# Patient Record
Sex: FEMALE | Race: WHITE | URBAN - METROPOLITAN AREA
[De-identification: names, ages, dates, MRNs, and addresses within clinical notes are randomized per-mention and may not be internally consistent; named-entity substitution may affect disease eponyms.]

---

## 2017-05-15 PROBLEM — T81.89XA NON-HEALING SURGICAL WOUND: Status: ACTIVE | Noted: 2017-01-04

## 2017-07-27 PROBLEM — B35.1 ONYCHOMYCOSIS OF TOENAIL: Status: ACTIVE | Noted: 2017-07-27

## 2018-05-17 PROBLEM — G47.33 OBSTRUCTIVE SLEEP APNEA SYNDROME: Status: ACTIVE | Noted: 2018-05-17

## 2018-12-21 ENCOUNTER — OFFICE VISIT (OUTPATIENT)
Dept: PRIMARY CARE CLINIC | Age: 83
End: 2018-12-21
Payer: MEDICARE

## 2018-12-21 VITALS
SYSTOLIC BLOOD PRESSURE: 138 MMHG | BODY MASS INDEX: 21.9 KG/M2 | DIASTOLIC BLOOD PRESSURE: 86 MMHG | WEIGHT: 127.6 LBS | OXYGEN SATURATION: 98 % | HEART RATE: 58 BPM

## 2018-12-21 DIAGNOSIS — I10 ESSENTIAL HYPERTENSION: ICD-10-CM

## 2018-12-21 DIAGNOSIS — E03.9 HYPOTHYROIDISM, UNSPECIFIED TYPE: ICD-10-CM

## 2018-12-21 DIAGNOSIS — G47.33 OBSTRUCTIVE SLEEP APNEA SYNDROME: Primary | ICD-10-CM

## 2018-12-21 DIAGNOSIS — R73.9 HYPERGLYCEMIA: ICD-10-CM

## 2018-12-21 PROCEDURE — 1101F PT FALLS ASSESS-DOCD LE1/YR: CPT | Performed by: FAMILY MEDICINE

## 2018-12-21 PROCEDURE — 1090F PRES/ABSN URINE INCON ASSESS: CPT | Performed by: FAMILY MEDICINE

## 2018-12-21 PROCEDURE — G8420 CALC BMI NORM PARAMETERS: HCPCS | Performed by: FAMILY MEDICINE

## 2018-12-21 PROCEDURE — G8484 FLU IMMUNIZE NO ADMIN: HCPCS | Performed by: FAMILY MEDICINE

## 2018-12-21 PROCEDURE — G8400 PT W/DXA NO RESULTS DOC: HCPCS | Performed by: FAMILY MEDICINE

## 2018-12-21 PROCEDURE — G8427 DOCREV CUR MEDS BY ELIG CLIN: HCPCS | Performed by: FAMILY MEDICINE

## 2018-12-21 PROCEDURE — 4040F PNEUMOC VAC/ADMIN/RCVD: CPT | Performed by: FAMILY MEDICINE

## 2018-12-21 PROCEDURE — 99213 OFFICE O/P EST LOW 20 MIN: CPT | Performed by: FAMILY MEDICINE

## 2018-12-21 PROCEDURE — 1123F ACP DISCUSS/DSCN MKR DOCD: CPT | Performed by: FAMILY MEDICINE

## 2018-12-21 PROCEDURE — 1036F TOBACCO NON-USER: CPT | Performed by: FAMILY MEDICINE

## 2018-12-21 ASSESSMENT — ENCOUNTER SYMPTOMS
WHEEZING: 0
SORE THROAT: 0
COUGH: 0
NAUSEA: 0
ABDOMINAL PAIN: 0
EYE DISCHARGE: 0
RHINORRHEA: 0
SHORTNESS OF BREATH: 0
EYE REDNESS: 0
DIARRHEA: 0
VOMITING: 0

## 2018-12-26 RX ORDER — LEVOTHYROXINE SODIUM 0.15 MG/1
150 TABLET ORAL DAILY
Qty: 30 TABLET | Refills: 3 | Status: SHIPPED | OUTPATIENT
Start: 2018-12-26 | End: 2018-12-27 | Stop reason: SDUPTHER

## 2018-12-27 RX ORDER — LEVOTHYROXINE SODIUM 0.15 MG/1
150 TABLET ORAL DAILY
Qty: 90 TABLET | Refills: 3 | Status: SHIPPED | OUTPATIENT
Start: 2018-12-27 | End: 2019-12-06 | Stop reason: SDUPTHER

## 2019-04-16 LAB
AVERAGE GLUCOSE: 120
HBA1C MFR BLD: 5.8 %
TSH SERPL DL<=0.05 MIU/L-ACNC: NORMAL UIU/ML

## 2019-04-19 DIAGNOSIS — R73.9 HYPERGLYCEMIA: ICD-10-CM

## 2019-04-19 DIAGNOSIS — E03.9 HYPOTHYROIDISM, UNSPECIFIED TYPE: ICD-10-CM

## 2019-04-25 ENCOUNTER — OFFICE VISIT (OUTPATIENT)
Dept: PRIMARY CARE CLINIC | Age: 84
End: 2019-04-25
Payer: MEDICARE

## 2019-04-25 VITALS
HEART RATE: 64 BPM | SYSTOLIC BLOOD PRESSURE: 124 MMHG | OXYGEN SATURATION: 97 % | BODY MASS INDEX: 20.7 KG/M2 | WEIGHT: 120.6 LBS | DIASTOLIC BLOOD PRESSURE: 82 MMHG

## 2019-04-25 DIAGNOSIS — I10 ESSENTIAL HYPERTENSION: Primary | ICD-10-CM

## 2019-04-25 DIAGNOSIS — E03.9 HYPOTHYROIDISM, UNSPECIFIED TYPE: ICD-10-CM

## 2019-04-25 DIAGNOSIS — Z78.0 MENOPAUSE: ICD-10-CM

## 2019-04-25 PROCEDURE — 1090F PRES/ABSN URINE INCON ASSESS: CPT | Performed by: FAMILY MEDICINE

## 2019-04-25 PROCEDURE — 1123F ACP DISCUSS/DSCN MKR DOCD: CPT | Performed by: FAMILY MEDICINE

## 2019-04-25 PROCEDURE — 99213 OFFICE O/P EST LOW 20 MIN: CPT | Performed by: FAMILY MEDICINE

## 2019-04-25 PROCEDURE — 4040F PNEUMOC VAC/ADMIN/RCVD: CPT | Performed by: FAMILY MEDICINE

## 2019-04-25 PROCEDURE — G8427 DOCREV CUR MEDS BY ELIG CLIN: HCPCS | Performed by: FAMILY MEDICINE

## 2019-04-25 PROCEDURE — G8420 CALC BMI NORM PARAMETERS: HCPCS | Performed by: FAMILY MEDICINE

## 2019-04-25 PROCEDURE — 1036F TOBACCO NON-USER: CPT | Performed by: FAMILY MEDICINE

## 2019-04-25 PROCEDURE — G8400 PT W/DXA NO RESULTS DOC: HCPCS | Performed by: FAMILY MEDICINE

## 2019-04-25 ASSESSMENT — ENCOUNTER SYMPTOMS
WHEEZING: 0
NAUSEA: 0
ABDOMINAL PAIN: 0
EYE REDNESS: 0
RHINORRHEA: 0
COUGH: 0
DIARRHEA: 0
SHORTNESS OF BREATH: 0
VOMITING: 0
SORE THROAT: 0
EYE DISCHARGE: 0

## 2019-04-25 NOTE — PROGRESS NOTES
[facility] PRIMARY CARE  [id number] [facility]  Dept: 506 Jae Lee is a 81 y.o. female who presents today for her medical conditions/complaintsas noted below. Chief Complaint   Patient presents with    Medication Check     4 month follow up. HPI:     HPI- feeling okay. BP has been good. Lost some more weight- eating right and exercising. No refills needed. Using CPAP but only 1-4 hours a night. Goes to sleep doctor pretty soon. LDL Calculated (mg/dL)   Date Value   10/25/2018 84   03/02/2017 96   01/19/2017 102       (goal LDL is <100)   No results found for: AST, ALT, BUN  BP Readings from Last 3 Encounters:   04/25/19 124/82   12/21/18 138/86   08/23/18 128/72          (goal 120/80)    No past medical history on file. Past Surgical History:   Procedure Laterality Date    ANKLE FRACTURE SURGERY Left 10/18/2016    dr. Wolfgang Marcos         Family History   Problem Relation Age of Onset    Heart Disease Mother     Heart Attack Mother     Heart Disease Father     Heart Attack Father        Social History     Tobacco Use    Smoking status: Never Smoker    Smokeless tobacco: Never Used   Substance Use Topics    Alcohol use: Yes     Alcohol/week: 0.0 oz     Comment: ocassionally      Current Outpatient Medications   Medication Sig Dispense Refill    levothyroxine (SYNTHROID) 150 MCG tablet Take 1 tablet by mouth Daily 90 tablet 3    candesartan (ATACAND) 32 MG tablet Take 32 mg by mouth daily      amLODIPine (NORVASC) 2.5 MG tablet Take 2.5 mg by mouth      metoprolol tartrate (LOPRESSOR) 50 MG tablet Take 1 tablet by mouth 2 times daily 60 tablet 0    Multiple Vitamins-Minerals (MULTI FOR HER 50+) TABS Take 1 tablet by mouth daily       No current facility-administered medications for this visit.       Allergies   Allergen Reactions    Lorcet  [Hydrocodone-Acetaminophen]      Other reaction(s): Confusion  Other reaction(s): Confusion    Oxycodone-Acetaminophen Nausea Only    Sodium Chloride Nausea Only       Health Maintenance   Topic Date Due    DTaP/Tdap/Td vaccine (1 - Tdap) 07/26/1955    Shingles Vaccine (1 of 2) 07/26/1986    DEXA (modify frequency per FRAX score)  07/26/2001    Pneumococcal 65+ years Vaccine (2 of 2 - PPSV23) 05/17/2019    Flu vaccine (Season Ended) 09/01/2019    Potassium monitoring  10/25/2019    Creatinine monitoring  10/25/2019    TSH testing  04/16/2020       Subjective:      Review of Systems   Constitutional: Negative for chills and fever. HENT: Negative for rhinorrhea and sore throat. Eyes: Negative for discharge and redness. Respiratory: Negative for cough, shortness of breath and wheezing. Cardiovascular: Negative for chest pain and palpitations. Gastrointestinal: Negative for abdominal pain, diarrhea, nausea and vomiting. Genitourinary: Negative for dysuria and frequency. Musculoskeletal: Negative for arthralgias and myalgias. Neurological: Negative for dizziness, light-headedness and headaches. Psychiatric/Behavioral: Positive for sleep disturbance (not sleeping well.  ). Objective:     /82   Pulse 64   Wt 120 lb 9.6 oz (54.7 kg)   SpO2 97%   BMI 20.70 kg/m²   Physical Exam   Constitutional: She is oriented to person, place, and time. She appears well-developed and well-nourished. No distress. HENT:   Head: Normocephalic and atraumatic. Mouth/Throat: Oropharynx is clear and moist.   Eyes: Pupils are equal, round, and reactive to light. Conjunctivae are normal. Right eye exhibits no discharge. Left eye exhibits no discharge. No scleral icterus. Neck: No tracheal deviation present. No thyromegaly present. Cardiovascular: Normal rate, regular rhythm and normal heart sounds. No carotid bruits   Pulmonary/Chest: Effort normal and breath sounds normal. No respiratory distress. She has no wheezes.

## 2019-06-26 DIAGNOSIS — I10 ESSENTIAL HYPERTENSION: Primary | ICD-10-CM

## 2019-07-23 ENCOUNTER — PROCEDURE VISIT (OUTPATIENT)
Dept: PRIMARY CARE CLINIC | Age: 84
End: 2019-07-23
Payer: MEDICARE

## 2019-07-23 DIAGNOSIS — Z78.0 MENOPAUSE: Primary | ICD-10-CM

## 2019-07-23 PROCEDURE — 77080 DXA BONE DENSITY AXIAL: CPT | Performed by: FAMILY MEDICINE

## 2019-07-24 DIAGNOSIS — Z78.0 MENOPAUSE: ICD-10-CM

## 2019-08-26 ENCOUNTER — OFFICE VISIT (OUTPATIENT)
Dept: PRIMARY CARE CLINIC | Age: 84
End: 2019-08-26
Payer: MEDICARE

## 2019-08-26 VITALS
OXYGEN SATURATION: 98 % | SYSTOLIC BLOOD PRESSURE: 132 MMHG | BODY MASS INDEX: 20.05 KG/M2 | HEART RATE: 58 BPM | DIASTOLIC BLOOD PRESSURE: 82 MMHG | WEIGHT: 116.8 LBS

## 2019-08-26 DIAGNOSIS — I10 ESSENTIAL HYPERTENSION: Primary | ICD-10-CM

## 2019-08-26 DIAGNOSIS — G47.33 OBSTRUCTIVE SLEEP APNEA SYNDROME: ICD-10-CM

## 2019-08-26 DIAGNOSIS — E03.9 HYPOTHYROIDISM, UNSPECIFIED TYPE: ICD-10-CM

## 2019-08-26 DIAGNOSIS — M85.89 OSTEOPENIA OF MULTIPLE SITES: ICD-10-CM

## 2019-08-26 PROCEDURE — 1036F TOBACCO NON-USER: CPT | Performed by: FAMILY MEDICINE

## 2019-08-26 PROCEDURE — 1090F PRES/ABSN URINE INCON ASSESS: CPT | Performed by: FAMILY MEDICINE

## 2019-08-26 PROCEDURE — 4040F PNEUMOC VAC/ADMIN/RCVD: CPT | Performed by: FAMILY MEDICINE

## 2019-08-26 PROCEDURE — G8420 CALC BMI NORM PARAMETERS: HCPCS | Performed by: FAMILY MEDICINE

## 2019-08-26 PROCEDURE — G8427 DOCREV CUR MEDS BY ELIG CLIN: HCPCS | Performed by: FAMILY MEDICINE

## 2019-08-26 PROCEDURE — 99213 OFFICE O/P EST LOW 20 MIN: CPT | Performed by: FAMILY MEDICINE

## 2019-08-26 PROCEDURE — 1123F ACP DISCUSS/DSCN MKR DOCD: CPT | Performed by: FAMILY MEDICINE

## 2019-08-26 PROCEDURE — G8399 PT W/DXA RESULTS DOCUMENT: HCPCS | Performed by: FAMILY MEDICINE

## 2019-08-26 ASSESSMENT — PATIENT HEALTH QUESTIONNAIRE - PHQ9
SUM OF ALL RESPONSES TO PHQ9 QUESTIONS 1 & 2: 0
1. LITTLE INTEREST OR PLEASURE IN DOING THINGS: 0
SUM OF ALL RESPONSES TO PHQ QUESTIONS 1-9: 0
SUM OF ALL RESPONSES TO PHQ QUESTIONS 1-9: 0
2. FEELING DOWN, DEPRESSED OR HOPELESS: 0

## 2019-08-26 ASSESSMENT — ENCOUNTER SYMPTOMS
CONSTIPATION: 0
DIARRHEA: 0
BLOOD IN STOOL: 0
SHORTNESS OF BREATH: 0

## 2019-08-26 NOTE — PROGRESS NOTES
[address] [facility] PRIMARY CARE  [address] [place] [id number]  Dept: 506 Jae Lee is a 81 y.o. female who presents today for her medical conditions/complaintsas noted below. Chief Complaint   Patient presents with    Medication Check     4 month follow up.  Other     Pt would like to discuss her Dexa scan       HPI:     HPI Pt is here for her scheduled follow up and to discuss results of her recent Dexa scan. Has had a \"goofy\" feeling a few times starting last week and felt like her legs were rubbery. Had another episode this morning where her legs felt weak. LDL Calculated (mg/dL)   Date Value   10/25/2018 84   03/02/2017 96   01/19/2017 102       (goal LDL is <100)   No results found for: AST, ALT, BUN  BP Readings from Last 3 Encounters:   08/26/19 132/82   04/25/19 124/82   12/21/18 138/86          (goal 120/80)    No past medical history on file. Past Surgical History:   Procedure Laterality Date    ANKLE FRACTURE SURGERY Left 10/18/2016    dr. Katelyn Partida         Family History   Problem Relation Age of Onset    Heart Disease Mother     Heart Attack Mother     Heart Disease Father     Heart Attack Father        Social History     Tobacco Use    Smoking status: Never Smoker    Smokeless tobacco: Never Used   Substance Use Topics    Alcohol use:  Yes     Alcohol/week: 0.0 standard drinks     Comment: ocassionally      Current Outpatient Medications   Medication Sig Dispense Refill    levothyroxine (SYNTHROID) 150 MCG tablet Take 1 tablet by mouth Daily 90 tablet 3    candesartan (ATACAND) 32 MG tablet Take 32 mg by mouth daily      amLODIPine (NORVASC) 2.5 MG tablet Take 2.5 mg by mouth      metoprolol tartrate (LOPRESSOR) 50 MG tablet Take 1 tablet by mouth 2 times daily 60 tablet 0    Multiple Vitamins-Minerals (MULTI FOR HER 50+) TABS Take 1 tablet by mouth daily       No current hypertension     2. Hypothyroidism, unspecified type     3. Obstructive sleep apnea syndrome     4. Osteopenia of multiple sites          Plan:      Return in about 3 months (around 11/26/2019) for HTN f/u. No orders of the defined types were placed in this encounter. No orders of the defined types were placed in this encounter. Patient given educationalmaterials - see patient instructions. Discussed use, benefit, and side effectsof prescribed medications. All patient questions answered. Pt voiced understanding. Reviewed health maintenance. Instructed to continue current medications, diet andexercise. Patient agreed with treatment plan. Follow up as directed.      Electronicallysigned by Bryan Edmond MD on 8/26/2019 at 12:34 PM

## 2019-12-06 ENCOUNTER — OFFICE VISIT (OUTPATIENT)
Dept: PRIMARY CARE CLINIC | Age: 84
End: 2019-12-06
Payer: MEDICARE

## 2019-12-06 VITALS
WEIGHT: 117.2 LBS | BODY MASS INDEX: 20.12 KG/M2 | SYSTOLIC BLOOD PRESSURE: 116 MMHG | HEART RATE: 52 BPM | DIASTOLIC BLOOD PRESSURE: 80 MMHG

## 2019-12-06 DIAGNOSIS — R73.9 HYPERGLYCEMIA: ICD-10-CM

## 2019-12-06 DIAGNOSIS — E03.9 HYPOTHYROIDISM, UNSPECIFIED TYPE: ICD-10-CM

## 2019-12-06 DIAGNOSIS — I10 ESSENTIAL HYPERTENSION: Primary | ICD-10-CM

## 2019-12-06 DIAGNOSIS — G47.33 OBSTRUCTIVE SLEEP APNEA SYNDROME: ICD-10-CM

## 2019-12-06 PROCEDURE — 1036F TOBACCO NON-USER: CPT | Performed by: FAMILY MEDICINE

## 2019-12-06 PROCEDURE — G8484 FLU IMMUNIZE NO ADMIN: HCPCS | Performed by: FAMILY MEDICINE

## 2019-12-06 PROCEDURE — 4040F PNEUMOC VAC/ADMIN/RCVD: CPT | Performed by: FAMILY MEDICINE

## 2019-12-06 PROCEDURE — G8399 PT W/DXA RESULTS DOCUMENT: HCPCS | Performed by: FAMILY MEDICINE

## 2019-12-06 PROCEDURE — 1123F ACP DISCUSS/DSCN MKR DOCD: CPT | Performed by: FAMILY MEDICINE

## 2019-12-06 PROCEDURE — G0009 ADMIN PNEUMOCOCCAL VACCINE: HCPCS | Performed by: FAMILY MEDICINE

## 2019-12-06 PROCEDURE — G8427 DOCREV CUR MEDS BY ELIG CLIN: HCPCS | Performed by: FAMILY MEDICINE

## 2019-12-06 PROCEDURE — G8420 CALC BMI NORM PARAMETERS: HCPCS | Performed by: FAMILY MEDICINE

## 2019-12-06 PROCEDURE — 1090F PRES/ABSN URINE INCON ASSESS: CPT | Performed by: FAMILY MEDICINE

## 2019-12-06 PROCEDURE — 99213 OFFICE O/P EST LOW 20 MIN: CPT | Performed by: FAMILY MEDICINE

## 2019-12-06 PROCEDURE — 90732 PPSV23 VACC 2 YRS+ SUBQ/IM: CPT | Performed by: FAMILY MEDICINE

## 2019-12-06 RX ORDER — LEVOTHYROXINE SODIUM 0.15 MG/1
150 TABLET ORAL DAILY
Qty: 90 TABLET | Refills: 3 | Status: SHIPPED | OUTPATIENT
Start: 2019-12-06 | End: 2021-01-14 | Stop reason: SDUPTHER

## 2019-12-06 ASSESSMENT — ENCOUNTER SYMPTOMS
ABDOMINAL PAIN: 0
COUGH: 0
EYE REDNESS: 0
VOMITING: 0
WHEEZING: 0
RHINORRHEA: 0
SORE THROAT: 0
EYE DISCHARGE: 0
DIARRHEA: 0
SHORTNESS OF BREATH: 0
NAUSEA: 0

## 2019-12-09 LAB
ALBUMIN SERPL-MCNC: NORMAL G/DL
ALP BLD-CCNC: NORMAL U/L
ALT SERPL-CCNC: NORMAL U/L
ANION GAP SERPL CALCULATED.3IONS-SCNC: NORMAL MMOL/L
AST SERPL-CCNC: NORMAL U/L
AVERAGE GLUCOSE: 114
BASOPHILS ABSOLUTE: NORMAL
BASOPHILS RELATIVE PERCENT: NORMAL
BILIRUB SERPL-MCNC: NORMAL MG/DL
BUN BLDV-MCNC: NORMAL MG/DL
CALCIUM SERPL-MCNC: NORMAL MG/DL
CHLORIDE BLD-SCNC: NORMAL MMOL/L
CO2: NORMAL
CREAT SERPL-MCNC: NORMAL MG/DL
EOSINOPHILS ABSOLUTE: NORMAL
EOSINOPHILS RELATIVE PERCENT: NORMAL
GFR CALCULATED: NORMAL
GLUCOSE BLD-MCNC: 128 MG/DL
HBA1C MFR BLD: 5.6 %
HCT VFR BLD CALC: NORMAL %
HEMOGLOBIN: NORMAL
LYMPHOCYTES ABSOLUTE: NORMAL
LYMPHOCYTES RELATIVE PERCENT: NORMAL
MCH RBC QN AUTO: NORMAL PG
MCHC RBC AUTO-ENTMCNC: NORMAL G/DL
MCV RBC AUTO: NORMAL FL
MONOCYTES ABSOLUTE: NORMAL
MONOCYTES RELATIVE PERCENT: NORMAL
NEUTROPHILS ABSOLUTE: NORMAL
NEUTROPHILS RELATIVE PERCENT: NORMAL
PDW BLD-RTO: NORMAL %
PLATELET # BLD: NORMAL 10*3/UL
PMV BLD AUTO: NORMAL FL
POTASSIUM SERPL-SCNC: NORMAL MMOL/L
RBC # BLD: NORMAL 10*6/UL
SODIUM BLD-SCNC: NORMAL MMOL/L
TOTAL PROTEIN: NORMAL
TSH SERPL DL<=0.05 MIU/L-ACNC: NORMAL M[IU]/L
WBC # BLD: NORMAL 10*3/UL

## 2019-12-12 DIAGNOSIS — R73.9 HYPERGLYCEMIA: ICD-10-CM

## 2019-12-12 DIAGNOSIS — I10 ESSENTIAL HYPERTENSION: ICD-10-CM

## 2019-12-12 DIAGNOSIS — E03.9 HYPOTHYROIDISM, UNSPECIFIED TYPE: ICD-10-CM

## 2019-12-12 DIAGNOSIS — G47.33 OBSTRUCTIVE SLEEP APNEA SYNDROME: ICD-10-CM

## 2020-09-03 ENCOUNTER — OFFICE VISIT (OUTPATIENT)
Dept: PRIMARY CARE CLINIC | Age: 85
End: 2020-09-03
Payer: MEDICARE

## 2020-09-03 VITALS
SYSTOLIC BLOOD PRESSURE: 128 MMHG | BODY MASS INDEX: 20.87 KG/M2 | DIASTOLIC BLOOD PRESSURE: 72 MMHG | WEIGHT: 117.8 LBS | HEIGHT: 63 IN | OXYGEN SATURATION: 98 % | TEMPERATURE: 96.9 F | HEART RATE: 76 BPM

## 2020-09-03 PROBLEM — I24.9 ACUTE CORONARY SYNDROME (HCC): Status: ACTIVE | Noted: 2017-10-29

## 2020-09-03 PROBLEM — R55 SYNCOPE: Status: ACTIVE | Noted: 2017-10-30

## 2020-09-03 PROBLEM — T81.89XA NON-HEALING SURGICAL WOUND: Status: RESOLVED | Noted: 2017-01-04 | Resolved: 2020-09-03

## 2020-09-03 PROCEDURE — G8399 PT W/DXA RESULTS DOCUMENT: HCPCS | Performed by: FAMILY MEDICINE

## 2020-09-03 PROCEDURE — G8427 DOCREV CUR MEDS BY ELIG CLIN: HCPCS | Performed by: FAMILY MEDICINE

## 2020-09-03 PROCEDURE — G8420 CALC BMI NORM PARAMETERS: HCPCS | Performed by: FAMILY MEDICINE

## 2020-09-03 PROCEDURE — 99213 OFFICE O/P EST LOW 20 MIN: CPT | Performed by: FAMILY MEDICINE

## 2020-09-03 PROCEDURE — 4040F PNEUMOC VAC/ADMIN/RCVD: CPT | Performed by: FAMILY MEDICINE

## 2020-09-03 PROCEDURE — 1036F TOBACCO NON-USER: CPT | Performed by: FAMILY MEDICINE

## 2020-09-03 PROCEDURE — 1090F PRES/ABSN URINE INCON ASSESS: CPT | Performed by: FAMILY MEDICINE

## 2020-09-03 PROCEDURE — 1123F ACP DISCUSS/DSCN MKR DOCD: CPT | Performed by: FAMILY MEDICINE

## 2020-09-03 ASSESSMENT — ENCOUNTER SYMPTOMS
VOMITING: 0
SORE THROAT: 0
ABDOMINAL PAIN: 0
EYE DISCHARGE: 0
EYE REDNESS: 0
RHINORRHEA: 0
NAUSEA: 0
DIARRHEA: 0
COUGH: 0
WHEEZING: 0
SHORTNESS OF BREATH: 0

## 2020-09-03 ASSESSMENT — PATIENT HEALTH QUESTIONNAIRE - PHQ9
SUM OF ALL RESPONSES TO PHQ9 QUESTIONS 1 & 2: 0
2. FEELING DOWN, DEPRESSED OR HOPELESS: 0
SUM OF ALL RESPONSES TO PHQ QUESTIONS 1-9: 0
SUM OF ALL RESPONSES TO PHQ QUESTIONS 1-9: 0
1. LITTLE INTEREST OR PLEASURE IN DOING THINGS: 0

## 2020-09-03 NOTE — PROGRESS NOTES
She is not in acute distress. Appearance: She is well-developed. She is not ill-appearing. HENT:      Head: Normocephalic and atraumatic. Right Ear: External ear normal.      Left Ear: External ear normal.   Eyes:      General: No scleral icterus. Right eye: No discharge. Left eye: No discharge. Conjunctiva/sclera: Conjunctivae normal.      Pupils: Pupils are equal, round, and reactive to light. Neck:      Thyroid: No thyromegaly. Trachea: No tracheal deviation. Cardiovascular:      Rate and Rhythm: Normal rate and regular rhythm. Heart sounds: Normal heart sounds. Pulmonary:      Effort: Pulmonary effort is normal. No respiratory distress. Breath sounds: Normal breath sounds. No wheezing. Lymphadenopathy:      Cervical: No cervical adenopathy. Skin:     General: Skin is warm. Findings: No rash. Neurological:      Mental Status: She is alert and oriented to person, place, and time. Psychiatric:         Mood and Affect: Mood normal.         Behavior: Behavior normal.         Thought Content: Thought content normal.         Assessment:       Diagnosis Orders   1. Essential hypertension  Hemoglobin A1C    Comprehensive Metabolic Panel    CBC Auto Differential    TSH without Reflex   2. Obstructive sleep apnea syndrome  Hemoglobin A1C    Comprehensive Metabolic Panel    CBC Auto Differential    TSH without Reflex   3. Hypothyroidism, unspecified type  Hemoglobin A1C    Comprehensive Metabolic Panel    CBC Auto Differential    TSH without Reflex   4. Hyperglycemia  Hemoglobin A1C    Comprehensive Metabolic Panel    CBC Auto Differential    TSH without Reflex   5. Screening for colon cancer  Cologuard        Plan:    Pt is still healthy so would recommend cologuard if she wants. Return in about 6 months (around 3/3/2021) for HTN f/u, medication f/u.     Orders Placed This Encounter   Procedures    Cologuard     This test is performed by an external laboratory and is used for result attachment only. Please fill out the appropriate paperwork required by the processing laboratory. See [email or web address] for further information. Standing Status:   Future     Standing Expiration Date:   9/3/2021    Hemoglobin A1C     Standing Status:   Future     Standing Expiration Date:   9/4/2021    Comprehensive Metabolic Panel     Standing Status:   Future     Standing Expiration Date:   9/4/2021    CBC Auto Differential     Standing Status:   Future     Standing Expiration Date:   9/4/2021    TSH without Reflex     Standing Status:   Future     Standing Expiration Date:   9/4/2021     No orders of the defined types were placed in this encounter. Patient given educationalmaterials - see patient instructions. Discussed use, benefit, and side effectsof prescribed medications. All patient questions answered. Pt voiced understanding. Reviewed health maintenance. Instructed to continue current medications, diet andexercise. Patient agreed with treatment plan. Follow up as directed.      Electronicallysigned by Rozina Rhodes MD on 9/3/2020 at 9:55 AM

## 2020-10-27 LAB
ALBUMIN SERPL-MCNC: NORMAL G/DL
ALP BLD-CCNC: NORMAL U/L
ALT SERPL-CCNC: NORMAL U/L
ANION GAP SERPL CALCULATED.3IONS-SCNC: NORMAL MMOL/L
AST SERPL-CCNC: NORMAL U/L
AVERAGE GLUCOSE: 120
BASOPHILS ABSOLUTE: NORMAL
BASOPHILS RELATIVE PERCENT: NORMAL
BILIRUB SERPL-MCNC: NORMAL MG/DL
BUN BLDV-MCNC: NORMAL MG/DL
CALCIUM SERPL-MCNC: NORMAL MG/DL
CHLORIDE BLD-SCNC: NORMAL MMOL/L
CO2: NORMAL
CREAT SERPL-MCNC: NORMAL MG/DL
EOSINOPHILS ABSOLUTE: NORMAL
EOSINOPHILS RELATIVE PERCENT: NORMAL
GFR CALCULATED: NORMAL
GLUCOSE BLD-MCNC: 94 MG/DL
HBA1C MFR BLD: 5.8 %
HCT VFR BLD CALC: NORMAL %
HEMOGLOBIN: NORMAL
LYMPHOCYTES ABSOLUTE: NORMAL
LYMPHOCYTES RELATIVE PERCENT: NORMAL
MCH RBC QN AUTO: NORMAL PG
MCHC RBC AUTO-ENTMCNC: NORMAL G/DL
MCV RBC AUTO: NORMAL FL
MONOCYTES ABSOLUTE: NORMAL
MONOCYTES RELATIVE PERCENT: NORMAL
NEUTROPHILS ABSOLUTE: NORMAL
NEUTROPHILS RELATIVE PERCENT: NORMAL
PDW BLD-RTO: NORMAL %
PLATELET # BLD: NORMAL 10*3/UL
PMV BLD AUTO: NORMAL FL
POTASSIUM SERPL-SCNC: NORMAL MMOL/L
RBC # BLD: NORMAL 10*6/UL
SODIUM BLD-SCNC: NORMAL MMOL/L
TOTAL PROTEIN: NORMAL
TSH SERPL DL<=0.05 MIU/L-ACNC: NORMAL M[IU]/L
WBC # BLD: NORMAL 10*3/UL

## 2020-12-11 ENCOUNTER — OFFICE VISIT (OUTPATIENT)
Dept: PRIMARY CARE CLINIC | Age: 85
End: 2020-12-11
Payer: MEDICARE

## 2020-12-11 VITALS
HEIGHT: 63 IN | OXYGEN SATURATION: 97 % | SYSTOLIC BLOOD PRESSURE: 132 MMHG | BODY MASS INDEX: 20.94 KG/M2 | HEART RATE: 55 BPM | DIASTOLIC BLOOD PRESSURE: 78 MMHG | WEIGHT: 118.2 LBS | TEMPERATURE: 96.8 F

## 2020-12-11 PROBLEM — M17.9 OSTEOARTHRITIS OF KNEE: Status: ACTIVE | Noted: 2017-10-29

## 2020-12-11 PROBLEM — R94.31 ABNORMAL ELECTROCARDIOGRAPHY: Status: ACTIVE | Noted: 2017-10-29

## 2020-12-11 PROBLEM — M11.20 CHONDROCALCINOSIS: Status: ACTIVE | Noted: 2017-10-29

## 2020-12-11 PROBLEM — B35.1 ONYCHOMYCOSIS OF TOENAIL: Status: RESOLVED | Noted: 2017-07-27 | Resolved: 2020-12-11

## 2020-12-11 PROCEDURE — 1123F ACP DISCUSS/DSCN MKR DOCD: CPT | Performed by: FAMILY MEDICINE

## 2020-12-11 PROCEDURE — G0438 PPPS, INITIAL VISIT: HCPCS | Performed by: FAMILY MEDICINE

## 2020-12-11 PROCEDURE — 4040F PNEUMOC VAC/ADMIN/RCVD: CPT | Performed by: FAMILY MEDICINE

## 2020-12-11 PROCEDURE — G8484 FLU IMMUNIZE NO ADMIN: HCPCS | Performed by: FAMILY MEDICINE

## 2020-12-11 SDOH — HEALTH STABILITY: MENTAL HEALTH: HOW OFTEN DO YOU HAVE A DRINK CONTAINING ALCOHOL?: MONTHLY OR LESS

## 2020-12-11 ASSESSMENT — PATIENT HEALTH QUESTIONNAIRE - PHQ9
2. FEELING DOWN, DEPRESSED OR HOPELESS: 0
SUM OF ALL RESPONSES TO PHQ QUESTIONS 1-9: 0
SUM OF ALL RESPONSES TO PHQ9 QUESTIONS 1 & 2: 0
1. LITTLE INTEREST OR PLEASURE IN DOING THINGS: 0

## 2020-12-11 NOTE — PATIENT INSTRUCTIONS
Check with insurance about coverage for Adacel or Boostrix (Tetanus and whooping cough vaccine). Please bring copy of POA for [facility] for [facility] - 12/11/2020  Medicare offers a range of preventive health benefits. Some of the tests and screenings are paid in full while other may be subject to a deductible, co-insurance, and/or copay. Some of these benefits include a comprehensive review of your medical history including lifestyle, illnesses that may run in your family, and various assessments and screenings as appropriate. After reviewing your medical record and screening and assessments performed today your provider may have ordered immunizations, labs, imaging, and/or referrals for you. A list of these orders (if applicable) as well as your Preventive Care list are included within your After Visit Summary for your review. Other Preventive Recommendations:    · A preventive eye exam performed by an eye specialist is recommended every 1-2 years to screen for glaucoma; cataracts, macular degeneration, and other eye disorders. · A preventive dental visit is recommended every 6 months. · Try to get at least 150 minutes of exercise per week or 10,000 steps per day on a pedometer . · Order or download the FREE \"Exercise & Physical Activity: Your Everyday Guide\" from The [employer]. Call 1-885.749.2750 or search The [employer] online. · You need 6413-3087 mg of calcium and 1374-8909 IU of vitamin D per day. It is possible to meet your calcium requirement with diet alone, but a vitamin D supplement is usually necessary to meet this goal.  · When exposed to the sun, use a sunscreen that protects against both UVA and UVB radiation with an SPF of 30 or greater. Reapply every 2 to 3 hours or after sweating, drying off with a towel, or swimming. · Always wear a seat belt when traveling in a car.  Always wear a helmet when riding a bicycle or motorcycle.

## 2020-12-11 NOTE — PROGRESS NOTES
Medicare Annual Wellness Visit  Name: April Albarran Date: 2020   MRN: [id number] Sex: Female   Age: 81 y.o. Ethnicity: Non-/Non    : 1935 Race: Valencianelda Martinez A Marisol is here for Medicare AWV (Patient was given the words apple, margarito and watch. She was given the time of 2:15 to draw. )    Screenings for behavioral, psychosocial and functional/safety risks, and cognitive dysfunction are all negative except as indicated below. These results, as well as other patient data from the [address] form, are documented in Flowsheets linked to this Encounter. Allergies   Allergen Reactions    Lorcet  [Hydrocodone-Acetaminophen]      Other reaction(s): Confusion  Other reaction(s): Confusion    Oxycodone-Acetaminophen Nausea Only         Prior to Visit Medications    Medication Sig Taking?  Authorizing Provider   levothyroxine (SYNTHROID) 150 MCG tablet Take 1 tablet by mouth Daily Yes Kelli Persaud MD   candesartan (ATACAND) 32 MG tablet Take 32 mg by mouth daily Yes Historical Provider, MD   amLODIPine (NORVASC) 2.5 MG tablet Take 2.5 mg by mouth Yes Historical Provider, MD   metoprolol tartrate (LOPRESSOR) 50 MG tablet Take 1 tablet by mouth 2 times daily Yes Kelli Persaud MD   Multiple Vitamins-Minerals (MULTI FOR HER 50+) TABS Take 1 tablet by mouth daily Yes Historical Provider, MD         Past Medical History:   Diagnosis Date    Ankle fracture, left 10/28/2016    Bilateral neck pain 2015    Non-healing surgical wound 2017    Onychomycosis of toenail 2017    Open wound of ankle 2016       Past Surgical History:   Procedure Laterality Date    ANKLE FRACTURE SURGERY Left 10/18/2016    dr. Rashmi Silva Right 2015 approx    Dr. Jason Stone ARTHROSCOPY Left     [facility]    [address] approx    goiter   [address]         Family History   Problem Relation Age of Onset    Heart Disease Mother     Heart Attack Mother     Heart Disease Father     Heart Attack Father     Diabetes type 2  Father        CareTeam (Including outside providers/suppliers regularly involved in providing care):   Patient Care Team:  Maeve Wooten MD as PCP - General (Family Medicine)  Maeve Wooten MD as PCP - [facility] Empaneled Provider    Wt Readings from Last 3 Encounters:   12/11/20 118 lb 3.2 oz (53.6 kg)   09/03/20 117 lb 12.8 oz (53.4 kg)   12/06/19 117 lb 3.2 oz (53.2 kg)     Vitals:    12/11/20 0950   BP: 132/78   Pulse: 55   Temp: 96.8 °F (36 °C)   SpO2: 97%   Weight: 118 lb 3.2 oz (53.6 kg)   Height: 5' 3\" (1.6 m)     Body mass index is 20.94 kg/m². Based upon direct observation of the patient, evaluation of cognition reveals recent and remote memory intact. Physical Exam  Vitals signs and nursing note reviewed. Constitutional:       General: She is not in acute distress. Appearance: She is well-developed. She is not ill-appearing. HENT:      Head: Normocephalic and atraumatic. Right Ear: External ear normal.      Left Ear: External ear normal.   Eyes:      General: No scleral icterus. Right eye: No discharge. Left eye: No discharge. Conjunctiva/sclera: Conjunctivae normal.      Pupils: Pupils are equal, round, and reactive to light. Neck:      Thyroid: No thyromegaly. Trachea: No tracheal deviation. Cardiovascular:      Rate and Rhythm: Normal rate and regular rhythm. Heart sounds: Normal heart sounds. Pulmonary:      Effort: Pulmonary effort is normal. No respiratory distress. Breath sounds: Normal breath sounds. No wheezing. Lymphadenopathy:      Cervical: No cervical adenopathy. Skin:     General: Skin is warm. Findings: No rash. Neurological:      Mental Status: She is alert and oriented to person, place, and time.    Psychiatric:         Mood and Affect: Mood normal.         Behavior: Behavior normal.         Thought Content: Thought content normal.         Patient's complete Health Risk Assessment and screening values have been reviewed and are found in Flowsheets. The following problems were reviewed today and where indicated follow up appointments were made and/or referrals ordered. Positive Risk Factor Screenings with Interventions:       General Health and ACP:  General  In general, how would you say your health is?: Good  In the past 7 days, have you experienced any of the following? New or Increased Pain, New or Increased Fatigue, Loneliness, Social Isolation, Stress or Anger?: None of These  Do you get the social and emotional support that you need?: Yes  Do you have a Living Will?: Yes  Advance Directives     Power of [address] [place] [address] Will ACP-Advance Directive ACP-Power of     Not on File Not on File [place]      General Health Risk Interventions:  · Daughter Oz Murphy is listed as POA -HC   · Does not want CPR or vent long term, but okay to try short term if we think she will get better.      Safety:  Safety  Do you have working smoke detectors?: Yes  Have all throw rugs been removed or fastened?: (!) No  Do you have non-slip mats or surfaces in all bathtubs/showers?: (!) No  Do all of your stairways have a railing or banister?: Yes  Are your doorways, halls and stairs free of clutter?: Yes  Do you always fasten your seatbelt when you are in a car?: Yes  Safety Interventions:  · Home safety tips provided    Personalized Preventive Plan   Current Health Maintenance Status  Immunization History   Administered Date(s) Administered    Pneumococcal Conjugate 13-valent ([id number]) 05/17/2018    Pneumococcal Polysaccharide ([id number]) 12/06/2019        Health Maintenance   Topic Date Due    DTaP/Tdap/Td vaccine (1 - Tdap) 07/26/1955    Shingles Vaccine (1 of 2) 07/26/1986    Annual Wellness Visit (AWV)  05/29/2019    Flu vaccine (1) 09/03/2021 (Originally 9/1/2020)    TSH testing  10/27/2021    Potassium monitoring  10/27/2021    Creatinine monitoring  10/27/2021    DEXA (modify frequency per FRAX score)  Completed    Pneumococcal 65+ years Vaccine  Completed    Hepatitis A vaccine  Aged Out    Hepatitis B vaccine  Aged Out    Hib vaccine  Aged Out    Meningococcal (ACWY) vaccine  Aged Out     Recommendations for official.fm Due: see orders and patient instructions/AVS.  . Recommended screening schedule for the next 5-10 years is provided to the patient in written form: see Patient Instructions/AVS.    There are no diagnoses linked to this encounter.

## 2021-01-14 RX ORDER — LEVOTHYROXINE SODIUM 0.15 MG/1
150 TABLET ORAL DAILY
Qty: 90 TABLET | Refills: 3 | Status: SHIPPED | OUTPATIENT
Start: 2021-01-14 | End: 2021-12-20

## 2021-08-13 ENCOUNTER — OFFICE VISIT (OUTPATIENT)
Dept: PRIMARY CARE CLINIC | Age: 86
End: 2021-08-13
Payer: MEDICARE

## 2021-08-13 VITALS — BODY MASS INDEX: 20.76 KG/M2 | SYSTOLIC BLOOD PRESSURE: 134 MMHG | DIASTOLIC BLOOD PRESSURE: 82 MMHG | WEIGHT: 117.2 LBS

## 2021-08-13 DIAGNOSIS — R19.7 DIARRHEA, UNSPECIFIED TYPE: ICD-10-CM

## 2021-08-13 DIAGNOSIS — R07.89 COSTOCHONDRAL CHEST PAIN: ICD-10-CM

## 2021-08-13 DIAGNOSIS — E03.9 HYPOTHYROIDISM, UNSPECIFIED TYPE: ICD-10-CM

## 2021-08-13 DIAGNOSIS — Z12.31 ENCOUNTER FOR SCREENING MAMMOGRAM FOR BREAST CANCER: Primary | ICD-10-CM

## 2021-08-13 DIAGNOSIS — I10 ESSENTIAL HYPERTENSION: ICD-10-CM

## 2021-08-13 PROBLEM — R94.31 ABNORMAL ELECTROCARDIOGRAPHY: Status: RESOLVED | Noted: 2017-10-29 | Resolved: 2021-08-13

## 2021-08-13 PROBLEM — I24.9 ACUTE CORONARY SYNDROME (HCC): Status: RESOLVED | Noted: 2017-10-29 | Resolved: 2021-08-13

## 2021-08-13 LAB
BILIRUBIN, POC: NORMAL
BLOOD URINE, POC: NORMAL
CLARITY, POC: CLEAR
COLOR, POC: YELLOW
GLUCOSE URINE, POC: NORMAL
KETONES, POC: NORMAL
LEUKOCYTE EST, POC: NORMAL
NITRITE, POC: NORMAL
PH, POC: 7.5
PROTEIN, POC: NORMAL
SPECIFIC GRAVITY, POC: 1.02
UROBILINOGEN, POC: 0.2

## 2021-08-13 PROCEDURE — 99214 OFFICE O/P EST MOD 30 MIN: CPT | Performed by: FAMILY MEDICINE

## 2021-08-13 PROCEDURE — 81003 URINALYSIS AUTO W/O SCOPE: CPT | Performed by: FAMILY MEDICINE

## 2021-08-13 RX ORDER — NAPROXEN 500 MG/1
TABLET ORAL
COMMUNITY
Start: 2021-07-19 | End: 2021-08-13

## 2021-08-13 RX ORDER — LIDOCAINE 50 MG/G
1 PATCH TOPICAL EVERY 24 HOURS
COMMUNITY
Start: 2021-07-19 | End: 2021-08-13

## 2021-08-13 SDOH — ECONOMIC STABILITY: FOOD INSECURITY: WITHIN THE PAST 12 MONTHS, YOU WORRIED THAT YOUR FOOD WOULD RUN OUT BEFORE YOU GOT MONEY TO BUY MORE.: NEVER TRUE

## 2021-08-13 SDOH — ECONOMIC STABILITY: FOOD INSECURITY: WITHIN THE PAST 12 MONTHS, THE FOOD YOU BOUGHT JUST DIDN'T LAST AND YOU DIDN'T HAVE MONEY TO GET MORE.: NEVER TRUE

## 2021-08-13 ASSESSMENT — PATIENT HEALTH QUESTIONNAIRE - PHQ9
SUM OF ALL RESPONSES TO PHQ QUESTIONS 1-9: 1
SUM OF ALL RESPONSES TO PHQ9 QUESTIONS 1 & 2: 1
1. LITTLE INTEREST OR PLEASURE IN DOING THINGS: 0
SUM OF ALL RESPONSES TO PHQ QUESTIONS 1-9: 1
2. FEELING DOWN, DEPRESSED OR HOPELESS: 1
SUM OF ALL RESPONSES TO PHQ QUESTIONS 1-9: 1

## 2021-08-13 ASSESSMENT — ENCOUNTER SYMPTOMS
WHEEZING: 0
SHORTNESS OF BREATH: 0

## 2021-08-13 ASSESSMENT — SOCIAL DETERMINANTS OF HEALTH (SDOH): HOW HARD IS IT FOR YOU TO PAY FOR THE VERY BASICS LIKE FOOD, HOUSING, MEDICAL CARE, AND HEATING?: NOT HARD AT ALL

## 2021-08-13 NOTE — PROGRESS NOTES
[facility] PRIMARY CARE  [address] [place] [id number]  Dept: 506 Jae Lee is a 81 y.o. female Established patient, who presents today for her medical conditions/complaints as noted below. Chief Complaint   Patient presents with    Follow-Up from [facility] 7/19 for rib/chest pain. Patient \"felt something pop in her chest\" while cooking. HPI:     HPI- was in ER for pain in her chest.  Was cutting a melon and felt a pop in her chest wall- hurt really bad- took naprosyn and lidocaine patch for a week. Got better but has taken a while to heal.  Still hurts with certain things like sneezing and sitting up from laying position.       Reviewed prior notes None  Reviewed previous Imaging and Hospital Records    LDL Calculated (mg/dL)   Date Value   10/25/2018 84   03/02/2017 96   01/19/2017 102       (goal LDL is <100)   Hemoglobin A1C (%)   Date Value   10/27/2020 5.8     BP Readings from Last 3 Encounters:   08/13/21 134/82   12/11/20 132/78   09/03/20 128/72          (goal 120/80)    Past Medical History:   Diagnosis Date    Abnormal electrocardiography 10/29/2017    Acute coronary syndrome (HCC) 10/29/2017    Ankle fracture, left 10/28/2016    Bilateral neck pain 12/28/2015    Non-healing surgical wound 1/4/2017    Onychomycosis of toenail 7/27/2017    Open wound of ankle 12/21/2016      Past Surgical History:   Procedure Laterality Date    ANKLE FRACTURE SURGERY Left 10/18/2016    dr. Evy Dumont Bilateral 2014    KNEE SURGERY Right 2015 approx    Dr. Ida Nicholson- replacement    SHOULDER ARTHROSCOPY Left     [facility]    [facility]    THYROID SURGERY  1960 approx    goiter    TONSILLECTOMY  1956       Family History   Problem Relation Age of Onset    Heart Disease Mother     Heart Attack Mother     Heart Disease Father     Heart Attack Father     Diabetes type 2  Father     Cancer Other         multiple cancers on mom's side       Social History     Tobacco Use    Smoking status: Never Smoker    Smokeless tobacco: Never Used   Substance Use Topics    Alcohol use: Yes     Alcohol/week: 0.0 standard drinks     Comment: ocassionally      Current Outpatient Medications   Medication Sig Dispense Refill    levothyroxine (SYNTHROID) 150 MCG tablet Take 1 tablet by mouth Daily 90 tablet 3    candesartan (ATACAND) 32 MG tablet Take 32 mg by mouth daily      amLODIPine (NORVASC) 2.5 MG tablet Take 2.5 mg by mouth      metoprolol tartrate (LOPRESSOR) 50 MG tablet Take 1 tablet by mouth 2 times daily 60 tablet 0    Multiple Vitamins-Minerals (MULTI FOR HER 50+) TABS Take 1 tablet by mouth daily       No current facility-administered medications for this visit. Allergies   Allergen Reactions    Lorcet  [Hydrocodone-Acetaminophen]      Other reaction(s): Confusion  Other reaction(s): Confusion    Oxycodone-Acetaminophen Nausea Only       Health Maintenance   Topic Date Due    DTaP/Tdap/Td vaccine (1 - Tdap) Never done    Shingles Vaccine (1 of 2) Never done    Flu vaccine (1) 09/01/2021    TSH testing  10/27/2021    Potassium monitoring  10/27/2021    Creatinine monitoring  10/27/2021    Annual Wellness Visit (AWV)  12/12/2021    DEXA (modify frequency per FRAX score)  Completed    Pneumococcal 65+ years Vaccine  Completed    COVID-19 Vaccine  Completed    Hepatitis A vaccine  Aged Out    Hepatitis B vaccine  Aged Out    Hib vaccine  Aged Out    Meningococcal (ACWY) vaccine  Aged Out       Subjective:      Review of Systems   Constitutional: Negative for chills and fever. HENT: Negative for congestion. Respiratory: Negative for shortness of breath and wheezing. Cardiovascular: Positive for chest pain. Negative for palpitations. Neurological: Negative for dizziness and light-headedness.        Objective:     /82   Wt 117 lb 3.2 oz (53.2 kg)   BMI 20.76 kg/m²   Physical Exam  Vitals and nursing note reviewed. Constitutional:       General: She is not in acute distress. Appearance: She is well-developed. She is not ill-appearing. HENT:      Head: Normocephalic and atraumatic. Right Ear: External ear normal.      Left Ear: External ear normal.   Eyes:      General: No scleral icterus. Right eye: No discharge. Left eye: No discharge. Conjunctiva/sclera: Conjunctivae normal.      Pupils: Pupils are equal, round, and reactive to light. Neck:      Thyroid: No thyromegaly. Trachea: No tracheal deviation. Cardiovascular:      Rate and Rhythm: Normal rate and regular rhythm. Heart sounds: Normal heart sounds. Pulmonary:      Effort: Pulmonary effort is normal. No respiratory distress. Breath sounds: Normal breath sounds. No wheezing. Lymphadenopathy:      Cervical: No cervical adenopathy. Skin:     General: Skin is warm. Findings: No rash. Neurological:      Mental Status: She is alert and oriented to person, place, and time. Psychiatric:         Mood and Affect: Mood normal.         Behavior: Behavior normal.         Thought Content: Thought content normal.         Assessment/Plan:   1. Encounter for screening mammogram for breast cancer  -     SHAKIR DIGITAL SCREEN W OR WO CAD BILATERAL; Future  2. Costochondral chest pain  -     Comprehensive Metabolic Panel; Future  -     CBC Auto Differential; Future  -     Lipid Panel; Future  -     Magnesium; Future  -     TSH without Reflex; Future  3. Diarrhea, unspecified type  -     Comprehensive Metabolic Panel; Future  -     CBC Auto Differential; Future  -     Lipid Panel; Future  -     Magnesium; Future  -     TSH without Reflex; Future  -     POCT Urinalysis No Micro (Auto)  4. Essential hypertension  -     Comprehensive Metabolic Panel; Future  -     CBC Auto Differential; Future  -     Lipid Panel;  Future  -     Magnesium; Future  -     TSH without Reflex; Future  5. Hypothyroidism, unspecified type  -     Comprehensive Metabolic Panel; Future  -     CBC Auto Differential; Future  -     Lipid Panel; Future  -     Magnesium; Future  -     TSH without Reflex; Future       Return in about 3 months (around 11/13/2021). Orders Placed This Encounter   Procedures    SHAKIR DIGITAL SCREEN W OR WO CAD BILATERAL     Standing Status:   Future     Standing Expiration Date:   10/13/2022    Comprehensive Metabolic Panel     Standing Status:   Future     Standing Expiration Date:   8/14/2022    CBC Auto Differential     Standing Status:   Future     Standing Expiration Date:   8/14/2022    Lipid Panel     Standing Status:   Future     Standing Expiration Date:   8/14/2022     Order Specific Question:   Is Patient Fasting?/# of Hours     Answer:   12    Magnesium     Standing Status:   Future     Standing Expiration Date:   8/13/2022    TSH without Reflex     Standing Status:   Future     Standing Expiration Date:   8/14/2022    POCT Urinalysis No Micro (Auto)     No orders of the defined types were placed in this encounter. Patient given educational materials - see patient instructions. Discussed use, benefit, and side effects of prescribed medications. All patient questions answered. Pt voiced understanding. Reviewed health maintenance. Instructed to continue current medications, diet and exercise. Patient agreed with treatment plan. Follow up as directed.      Electronically signed by Surjit Nj MD on 8/13/2021 at 10:22 AM

## 2021-08-14 LAB
ALBUMIN SERPL-MCNC: NORMAL G/DL
ALP BLD-CCNC: NORMAL U/L
ALT SERPL-CCNC: NORMAL U/L
ANION GAP SERPL CALCULATED.3IONS-SCNC: NORMAL MMOL/L
AST SERPL-CCNC: NORMAL U/L
BASOPHILS ABSOLUTE: NORMAL
BASOPHILS RELATIVE PERCENT: NORMAL
BILIRUB SERPL-MCNC: NORMAL MG/DL
BUN BLDV-MCNC: NORMAL MG/DL
CALCIUM SERPL-MCNC: NORMAL MG/DL
CHLORIDE BLD-SCNC: NORMAL MMOL/L
CHOLESTEROL, TOTAL: 148 MG/DL
CHOLESTEROL/HDL RATIO: 2.5
CO2: NORMAL
CREAT SERPL-MCNC: NORMAL MG/DL
EOSINOPHILS ABSOLUTE: NORMAL
EOSINOPHILS RELATIVE PERCENT: NORMAL
GFR CALCULATED: NORMAL
GLUCOSE BLD-MCNC: 107 MG/DL
HCT VFR BLD CALC: NORMAL %
HDLC SERPL-MCNC: 59 MG/DL (ref 35–70)
HEMOGLOBIN: NORMAL
LDL CHOLESTEROL CALCULATED: 80 MG/DL (ref 0–160)
LYMPHOCYTES ABSOLUTE: NORMAL
LYMPHOCYTES RELATIVE PERCENT: NORMAL
MAGNESIUM: NORMAL
MCH RBC QN AUTO: NORMAL PG
MCHC RBC AUTO-ENTMCNC: NORMAL G/DL
MCV RBC AUTO: NORMAL FL
MONOCYTES ABSOLUTE: NORMAL
MONOCYTES RELATIVE PERCENT: NORMAL
NEUTROPHILS ABSOLUTE: NORMAL
NEUTROPHILS RELATIVE PERCENT: NORMAL
NONHDLC SERPL-MCNC: NORMAL MG/DL
PDW BLD-RTO: NORMAL %
PLATELET # BLD: NORMAL 10*3/UL
PMV BLD AUTO: NORMAL FL
POTASSIUM SERPL-SCNC: NORMAL MMOL/L
RBC # BLD: NORMAL 10*6/UL
SODIUM BLD-SCNC: NORMAL MMOL/L
TOTAL PROTEIN: NORMAL
TRIGL SERPL-MCNC: 43 MG/DL
TSH SERPL DL<=0.05 MIU/L-ACNC: NORMAL M[IU]/L
VLDLC SERPL CALC-MCNC: 9 MG/DL
WBC # BLD: NORMAL 10*3/UL

## 2021-08-18 DIAGNOSIS — I10 ESSENTIAL HYPERTENSION: ICD-10-CM

## 2021-08-18 DIAGNOSIS — E03.9 HYPOTHYROIDISM, UNSPECIFIED TYPE: ICD-10-CM

## 2021-08-18 DIAGNOSIS — R07.89 COSTOCHONDRAL CHEST PAIN: ICD-10-CM

## 2021-08-18 DIAGNOSIS — R19.7 DIARRHEA, UNSPECIFIED TYPE: ICD-10-CM

## 2021-08-19 ENCOUNTER — TELEPHONE (OUTPATIENT)
Dept: PRIMARY CARE CLINIC | Age: 86
End: 2021-08-19

## 2021-08-19 DIAGNOSIS — R19.7 DIARRHEA, UNSPECIFIED TYPE: Primary | ICD-10-CM

## 2021-08-19 NOTE — TELEPHONE ENCOUNTER
Pt states she is still having abdominal cramping and diarrhea occasionally. She is asking what you would like for her to do. She is not taking anything otc for it.

## 2021-08-30 ENCOUNTER — TELEPHONE (OUTPATIENT)
Dept: PRIMARY CARE CLINIC | Age: 86
End: 2021-08-30

## 2021-08-30 NOTE — TELEPHONE ENCOUNTER
----- Message from Obie Sarah sent at 8/30/2021 10:33 AM EDT -----  Subject: Message to Provider    QUESTIONS  Information for Provider? Pt sent a stool sample in on 8/23/21 and she is   wondering if you have the results. Please call pt.  ---------------------------------------------------------------------------  --------------  CALL BACK INFO  What is the best way for the office to contact you? OK to leave message on   voicemail  Preferred Call Back Phone Number? [phone]  ---------------------------------------------------------------------------  --------------  SCRIPT ANSWERS  Relationship to Patient?  Self

## 2021-08-30 NOTE — TELEPHONE ENCOUNTER
L.M. for pt to call back and let us know where she went for test, so we can call to get the results.

## 2021-12-13 ENCOUNTER — OFFICE VISIT (OUTPATIENT)
Dept: PRIMARY CARE CLINIC | Age: 86
End: 2021-12-13
Payer: MEDICARE

## 2021-12-13 ENCOUNTER — TELEPHONE (OUTPATIENT)
Dept: PRIMARY CARE CLINIC | Age: 86
End: 2021-12-13

## 2021-12-13 VITALS
DIASTOLIC BLOOD PRESSURE: 80 MMHG | WEIGHT: 120.6 LBS | HEIGHT: 63 IN | SYSTOLIC BLOOD PRESSURE: 124 MMHG | OXYGEN SATURATION: 99 % | BODY MASS INDEX: 21.37 KG/M2 | HEART RATE: 60 BPM

## 2021-12-13 DIAGNOSIS — Z00.00 ROUTINE GENERAL MEDICAL EXAMINATION AT A HEALTH CARE FACILITY: ICD-10-CM

## 2021-12-13 DIAGNOSIS — Z00.00 MEDICARE ANNUAL WELLNESS VISIT, SUBSEQUENT: Primary | ICD-10-CM

## 2021-12-13 PROBLEM — R07.89 COSTOCHONDRAL CHEST PAIN: Status: RESOLVED | Noted: 2021-08-13 | Resolved: 2021-12-13

## 2021-12-13 PROCEDURE — G0439 PPPS, SUBSEQ VISIT: HCPCS | Performed by: FAMILY MEDICINE

## 2021-12-13 ASSESSMENT — PATIENT HEALTH QUESTIONNAIRE - PHQ9
SUM OF ALL RESPONSES TO PHQ QUESTIONS 1-9: 0
2. FEELING DOWN, DEPRESSED OR HOPELESS: 0
1. LITTLE INTEREST OR PLEASURE IN DOING THINGS: 0
SUM OF ALL RESPONSES TO PHQ9 QUESTIONS 1 & 2: 0
SUM OF ALL RESPONSES TO PHQ QUESTIONS 1-9: 0
SUM OF ALL RESPONSES TO PHQ QUESTIONS 1-9: 0

## 2021-12-13 NOTE — TELEPHONE ENCOUNTER
Called patient regarding her [employer] form - patient needed to sign form. Please transfer call to [facility] when she returns call.

## 2021-12-13 NOTE — PROGRESS NOTES
Medicare Annual Wellness Visit  Name: Maurilio Balderas Date: 2021   MRN: [id number] Sex: Female   Age: 81 y.o. Ethnicity: Non- / Non    : 1935 Race: White (non-)      Rosmery Damon is here for Medicare AWV (Patient was given the words apple,margarito and watch to remember. She was given the time of 3:45 to draw. )    Screenings for behavioral, psychosocial and functional/safety risks, and cognitive dysfunction are all negative except as indicated below. These results, as well as other patient data from the [address] [address] form, are documented in Flowsheets linked to this Encounter. Allergies   Allergen Reactions    Lorcet [Hydrocodone-Acetaminophen] Other (See Comments)     Confusion    Oxycodone-Acetaminophen Nausea Only       Prior to Visit Medications    Medication Sig Taking?  Authorizing Provider   levothyroxine (SYNTHROID) 150 MCG tablet Take 1 tablet by mouth Daily Yes Gunnar Genao MD   candesartan (ATACAND) 32 MG tablet Take 32 mg by mouth daily Yes Historical Provider, MD   amLODIPine (NORVASC) 2.5 MG tablet Take 2.5 mg by mouth Yes Historical Provider, MD   metoprolol tartrate (LOPRESSOR) 50 MG tablet Take 1 tablet by mouth 2 times daily Yes Gunnar Genao MD   Multiple Vitamins-Minerals (MULTI FOR HER 50+) TABS Take 1 tablet by mouth daily Yes Historical Provider, MD       Past Medical History:   Diagnosis Date    Abnormal electrocardiography 10/29/2017    Acute coronary syndrome ([address] 10/29/2017    Ankle fracture, left 10/28/2016    Bilateral neck pain 2015    Non-healing surgical wound 2017    Onychomycosis of toenail 2017    Open wound of ankle 2016       Past Surgical History:   Procedure Laterality Date    ANKLE FRACTURE SURGERY Left 10/18/2016    dr. Yaron Noe Bilateral 2014    KNEE SURGERY Right 2015 approx    Dr. Will Patterson- replacement    [facility]ROS[facility]    [facility]    THYROID SURGERY  1960 approx    goiter    TONSILLECTOMY  1956       Family History   Problem Relation Age of Onset    Heart Disease Mother     Heart Attack Mother     Heart Disease Father     Heart Attack Father     Diabetes type 2  Father     Cancer Other         multiple cancers on mom's side       CareTeam (Including outside providers/suppliers regularly involved in providing care):   Patient Care Team:  Tiffany Garcia MD as PCP - General (Family Medicine)  Tiffany Garcia MD as PCP - [facility] Empaneled Provider    Wt Readings from Last 3 Encounters:   12/13/21 120 lb 9.6 oz (54.7 kg)   08/13/21 117 lb 3.2 oz (53.2 kg)   12/11/20 118 lb 3.2 oz (53.6 kg)     Vitals:    12/13/21 1320   BP: 124/80   Pulse: 60   SpO2: 99%   Weight: 120 lb 9.6 oz (54.7 kg)   Height: 5' 3\" (1.6 m)     Body mass index is 21.36 kg/m². Based upon direct observation of the patient, evaluation of cognition reveals seems to forget names, but no trouble remembering the words given. Usually will come to her eventually. Brandon Sierra Physical Exam  Vitals and nursing note reviewed. Constitutional:       General: She is not in acute distress. Appearance: She is well-developed. She is not ill-appearing. HENT:      Head: Normocephalic and atraumatic. Right Ear: External ear normal.      Left Ear: External ear normal.   Eyes:      General: No scleral icterus. Right eye: No discharge. Left eye: No discharge. Conjunctiva/sclera: Conjunctivae normal.      Pupils: Pupils are equal, round, and reactive to light. Neck:      Thyroid: No thyromegaly. Trachea: No tracheal deviation. Cardiovascular:      Rate and Rhythm: Normal rate and regular rhythm. Heart sounds: Normal heart sounds. Pulmonary:      Effort: Pulmonary effort is normal. No respiratory distress. Breath sounds: Normal breath sounds. No wheezing. Lymphadenopathy:      Cervical: No cervical adenopathy. Skin:     General: Skin is warm. Findings: No rash. Neurological:      Mental Status: She is alert and oriented to person, place, and time. Psychiatric:         Mood and Affect: Mood normal.         Behavior: Behavior normal.         Thought Content: Thought content normal.         Patient's complete Health Risk Assessment and screening values have been reviewed and are found in Flowsheets. The following problems were reviewed today and where indicated follow up appointments were made and/or referrals ordered. Positive Risk Factor Screenings with Interventions:          General Health and ACP:  General  In general, how would you say your health is?: Good  In the past 7 days, have you experienced any of the following?  New or Increased Pain, New or Increased Fatigue, Loneliness, Social Isolation, Stress or Anger?: None of These  Do you get the social and emotional support that you need?: Yes  Do you have a Living Will?: Yes  Advance Directives     Power of [facility] Will ACP-Advance Directive ACP-Power of     Not on File Not on File Not on File Not on File      General Health Risk Interventions:  · has POA-  is decision maker, Payton Hubbard- daughter is secondary      Safety:  Safety  Do you have working smoke detectors?: Yes  Have all throw rugs been removed or fastened?: (!) No  Do you have non-slip mats or surfaces in all bathtubs/showers?: (!) No  Do all of your stairways have a railing or banister?: Yes  Are your doorways, halls and stairs free of clutter?: Yes  Do you always fasten your seatbelt when you are in a car?: Yes  Safety Interventions:  · Home safety tips provided     Personalized Preventive Plan   Current Health Maintenance Status  Immunization History   Administered Date(s) Administered    COVID-19, Piznon Peter, PF, 30mcg/0.3mL 01/22/2021, 02/12/2021, 10/14/2021    Hepatitis B Ped/Adol (Engerix-B, Recombivax HB) 10/19/1994, 11/16/1994, 05/03/1995    Influenza, High Dose (Fluzone 65 yrs and older) 11/30/2015, 08/31/2017    Pneumococcal Conjugate 13-valent ([place]) 08/28/2015, 05/17/2018    Pneumococcal Polysaccharide ([id number]) 12/06/2019    Zoster Recombinant (Shingrix) 08/13/2021        Health Maintenance   Topic Date Due    DTaP/Tdap/Td vaccine (1 - Tdap) Never done    Flu vaccine (1) 09/01/2021    Shingles Vaccine (2 of 2) 10/08/2021    Annual Wellness Visit (AWV)  12/12/2021    TSH testing  08/14/2022    Potassium monitoring  08/14/2022    Creatinine monitoring  08/14/2022    DEXA (modify frequency per FRAX score)  Completed    Pneumococcal 65+ years Vaccine  Completed    COVID-19 Vaccine  Completed    Hepatitis A vaccine  Aged Out    Hepatitis B vaccine  Aged Out    Hib vaccine  Aged Out    Meningococcal (ACWY) vaccine  Aged Out     Recommendations for [employer] Due: see orders and patient instructions/AVS.  . Recommended screening schedule for the next 5-10 years is provided to the patient in written form: see Patient Instructions/AVS.    There are no diagnoses linked to this encounter.

## 2021-12-13 NOTE — PATIENT INSTRUCTIONS
Personalized Preventive Plan for Luis E Damon - 12/13/2021  Medicare offers a range of preventive health benefits. Some of the tests and screenings are paid in full while other may be subject to a deductible, co-insurance, and/or copay. Some of these benefits include a comprehensive review of your medical history including lifestyle, illnesses that may run in your family, and various assessments and screenings as appropriate. After reviewing your medical record and screening and assessments performed today your provider may have ordered immunizations, labs, imaging, and/or referrals for you. A list of these orders (if applicable) as well as your Preventive Care list are included within your After Visit Summary for your review. Other Preventive Recommendations:    · A preventive eye exam performed by an eye specialist is recommended every 1-2 years to screen for glaucoma; cataracts, macular degeneration, and other eye disorders. · A preventive dental visit is recommended every 6 months. · Try to get at least 150 minutes of exercise per week or 10,000 steps per day on a pedometer . · Order or download the FREE \"Exercise & Physical Activity: Your Everyday Guide\" from [employer]. Call 6-396.261.3384 or search [employer] online. · You need 7467-5057 mg of calcium and 9595-5220 IU of vitamin D per day. It is possible to meet your calcium requirement with diet alone, but a vitamin D supplement is usually necessary to meet this goal.  · When exposed to the sun, use a sunscreen that protects against both UVA and UVB radiation with an SPF of 30 or greater. Reapply every 2 to 3 hours or after sweating, drying off with a towel, or swimming. · Always wear a seat belt when traveling in a car. Always wear a helmet when riding a bicycle or motorcycle.

## 2021-12-20 RX ORDER — LEVOTHYROXINE SODIUM 150 MCG
TABLET ORAL
Qty: 90 TABLET | Refills: 3 | Status: SHIPPED | OUTPATIENT
Start: 2021-12-20

## 2021-12-20 NOTE — TELEPHONE ENCOUNTER
LAST VISIT:   12/13/2021     Future Appointments   Date Time Provider Kamlesh Russell   6/13/2022  1:00 PM Alexandrea Love MD [facility]

## 2022-01-11 DIAGNOSIS — Z12.31 ENCOUNTER FOR SCREENING MAMMOGRAM FOR BREAST CANCER: ICD-10-CM

## 2022-04-14 ENCOUNTER — OFFICE VISIT (OUTPATIENT)
Dept: PRIMARY CARE CLINIC | Age: 87
End: 2022-04-14
Payer: MEDICARE

## 2022-04-14 VITALS
OXYGEN SATURATION: 98 % | BODY MASS INDEX: 21.15 KG/M2 | SYSTOLIC BLOOD PRESSURE: 160 MMHG | DIASTOLIC BLOOD PRESSURE: 70 MMHG | HEART RATE: 48 BPM | WEIGHT: 119.4 LBS

## 2022-04-14 DIAGNOSIS — R19.7 DIARRHEA, UNSPECIFIED TYPE: Primary | ICD-10-CM

## 2022-04-14 DIAGNOSIS — K52.9 CHRONIC DIARRHEA OF UNKNOWN ORIGIN: ICD-10-CM

## 2022-04-14 PROCEDURE — 99213 OFFICE O/P EST LOW 20 MIN: CPT | Performed by: FAMILY MEDICINE

## 2022-04-14 RX ORDER — PSYLLIUM SEED (WITH DEXTROSE)
1 POWDER (GRAM) ORAL DAILY
Qty: 1 EACH | Refills: 0 | COMMUNITY
Start: 2022-04-14 | End: 2022-05-14

## 2022-04-14 ASSESSMENT — ENCOUNTER SYMPTOMS
ANAL BLEEDING: 0
ABDOMINAL DISTENTION: 1
DIARRHEA: 1
BLOOD IN STOOL: 0
CONSTIPATION: 0
ABDOMINAL PAIN: 0

## 2022-04-14 NOTE — PROGRESS NOTES
[facility] PRIMARY CARE  [facility] [address] [place] [address]  Dept: 506 Jae Lee is a 81 y.o. female Established patient, who presents today for her medical conditions/complaints as noted below. Chief Complaint   Patient presents with    Diarrhea     Patient c/o of diarrhea off and on x 1 year       HPI:     HPI- diarrhea off and on for the past year. Will have it for weeks and then go a while (week or two) being normal.  No constipation. Stool studies were negative in august last year. Continues with symptoms. Sometimes up to 5 stools per day.       Reviewed prior notes None  Reviewed previous Labs    LDL Calculated (mg/dL)   Date Value   08/14/2021 80   10/25/2018 84   03/02/2017 96       (goal LDL is <100)   Hemoglobin A1C (%)   Date Value   10/27/2020 5.8     BP Readings from Last 3 Encounters:   04/14/22 (!) 168/70   12/13/21 124/80   08/13/21 134/82          (goal 120/80)    Past Medical History:   Diagnosis Date    Abnormal electrocardiography 10/29/2017    Acute coronary syndrome (HCC) 10/29/2017    Ankle fracture, left 10/28/2016    Bilateral neck pain 12/28/2015    Costochondral chest pain 8/13/2021    Non-healing surgical wound 1/4/2017    Onychomycosis of toenail 7/27/2017    Open wound of ankle 12/21/2016      Past Surgical History:   Procedure Laterality Date    ANKLE FRACTURE SURGERY Left 10/18/2016    dr. Tae Steiner Bilateral 2014    KNEE SURGERY Right 2015 approx    Dr. Dowell Speaker- replacement    SHOULDER ARTHROSCOPY Left     [facility]    SHOULDER SURGERY Left 2015    [facility]    THYROID SURGERY  1960 approx    goiter    TONSILLECTOMY  1956       Family History   Problem Relation Age of Onset    Heart Disease Mother     Heart Attack Mother     Heart Disease Father     Heart Attack Father     Diabetes type 2  Father     Cancer Other         multiple cancers on mom's side       Social History     Tobacco Use    Smoking status: Never Smoker    Smokeless tobacco: Never Used   Substance Use Topics    Alcohol use: Yes     Comment: ocassionally- few times a year      Current Outpatient Medications   Medication Sig Dispense Refill    psyllium (METAMUCIL) 28 % packet Take 1 packet by mouth daily Take with full glass of h20 or juice 1 each 0    SYNTHROID 150 MCG tablet TAKE 1 TABLET DAILY 90 tablet 3    candesartan (ATACAND) 32 MG tablet Take 32 mg by mouth daily      amLODIPine (NORVASC) 2.5 MG tablet Take 2.5 mg by mouth      metoprolol tartrate (LOPRESSOR) 50 MG tablet Take 1 tablet by mouth 2 times daily 60 tablet 0    Multiple Vitamins-Minerals (MULTI FOR HER 50+) TABS Take 1 tablet by mouth daily       No current facility-administered medications for this visit. Allergies   Allergen Reactions    Lorcet [Hydrocodone-Acetaminophen] Other (See Comments)     Confusion    Oxycodone-Acetaminophen Nausea Only       Health Maintenance   Topic Date Due    DTaP/Tdap/Td vaccine (1 - Tdap) Never done    TSH testing  08/14/2022    Potassium monitoring  08/14/2022    Creatinine monitoring  08/14/2022    Flu vaccine (Season Ended) 09/01/2022    Depression Screen  12/13/2022    Annual Wellness Visit (AWV)  12/14/2022    DEXA (modify frequency per FRAX score)  Completed    Shingles Vaccine  Completed    Pneumococcal 65+ years Vaccine  Completed    COVID-19 Vaccine  Completed    Hepatitis A vaccine  Aged Out    Hepatitis B vaccine  Aged Out    Hib vaccine  Aged Out    Meningococcal (ACWY) vaccine  Aged Out       Subjective:      Review of Systems   Constitutional: Negative for chills and fever. HENT: Negative for congestion. Gastrointestinal: Positive for abdominal distention and diarrhea. Negative for abdominal pain, anal bleeding, blood in stool and constipation. Neurological: Negative for dizziness and headaches.        Objective:     BP (!) 168/70   Pulse (!) 48   Wt 119 lb 6.4 oz (54.2 kg)   SpO2 98%   BMI 21.15 kg/m²   Physical Exam  Vitals and nursing note reviewed. Constitutional:       Appearance: Normal appearance. HENT:      Head: Normocephalic and atraumatic. Eyes:      General: No scleral icterus. Right eye: No discharge. Conjunctiva/sclera: Conjunctivae normal.   Pulmonary:      Effort: Pulmonary effort is normal. No respiratory distress. Skin:     Coloration: Skin is not jaundiced or pale. Neurological:      General: No focal deficit present. Mental Status: She is alert. Psychiatric:         Mood and Affect: Mood normal.         Behavior: Behavior normal.         Thought Content: Thought content normal.         Assessment/Plan:   1. Diarrhea, unspecified type  Comments:  keep food and symptoms diary and get CT scan. If CT is negative then get cscope done  Orders:  -     CT ABDOMEN PELVIS W IV CONTRAST Additional Contrast? Radiologist Recommendation; Future  2. Chronic diarrhea of unknown origin  -     [facility]       Return in about 6 weeks (around 5/26/2022). Orders Placed This Encounter   Procedures    CT ABDOMEN PELVIS W IV CONTRAST Additional Contrast? Radiologist Recommendation     Standing Status:   Future     Standing Expiration Date:   4/15/2023     Order Specific Question:   Additional Contrast?     Answer:   Radiologist Recommendation     Order Specific Question:   STAT Creatinine as needed:     Answer:    Yes   [facility]     Referral Priority:   Routine     Referral Type:   Eval and Treat     Referral Reason:   Specialty Services Required     Requested Specialty:   Gastroenterology     Number of Visits Requested:   1     Orders Placed This Encounter   Medications    psyllium (METAMUCIL) 28 % packet     Sig: Take 1 packet by mouth daily Take with full glass of h20 or juice     Dispense:  1 each     Refill:  0       Patient given educational materials - see patient instructions. Discussed use, benefit, and side effects of prescribed medications. All patient questions answered. Pt voiced understanding. Reviewed health maintenance. Instructed to continue current medications, diet and exercise. Patient agreed with treatment plan. Follow up as directed.      Electronically signed by Kane Duarte MD on 4/14/2022 at 9:26 AM

## 2022-04-20 ENCOUNTER — TELEPHONE (OUTPATIENT)
Dept: PRIMARY CARE CLINIC | Age: 87
End: 2022-04-20

## 2022-04-20 NOTE — TELEPHONE ENCOUNTER
Pre-cert department is calling and stated that a Peer to Peer is needed for the CT abd pelvis.     Phone number to call: [phone]    Order #[id number]

## 2022-05-06 ENCOUNTER — OFFICE VISIT (OUTPATIENT)
Dept: GASTROENTEROLOGY | Age: 87
End: 2022-05-06
Payer: MEDICARE

## 2022-05-06 ENCOUNTER — TELEPHONE (OUTPATIENT)
Dept: GASTROENTEROLOGY | Age: 87
End: 2022-05-06

## 2022-05-06 VITALS
HEART RATE: 60 BPM | SYSTOLIC BLOOD PRESSURE: 131 MMHG | OXYGEN SATURATION: 98 % | BODY MASS INDEX: 21.08 KG/M2 | WEIGHT: 119 LBS | DIASTOLIC BLOOD PRESSURE: 74 MMHG

## 2022-05-06 DIAGNOSIS — K52.9 CHRONIC DIARRHEA: Primary | ICD-10-CM

## 2022-05-06 PROCEDURE — 99204 OFFICE O/P NEW MOD 45 MIN: CPT | Performed by: INTERNAL MEDICINE

## 2022-05-06 RX ORDER — CHLORAL HYDRATE 500 MG
CAPSULE ORAL
COMMUNITY

## 2022-05-06 ASSESSMENT — ENCOUNTER SYMPTOMS
ALLERGIC/IMMUNOLOGIC NEGATIVE: 1
DIARRHEA: 1
BACK PAIN: 0
COUGH: 0
TROUBLE SWALLOWING: 0
ABDOMINAL PAIN: 0
CHOKING: 0
RECTAL PAIN: 0
SINUS PRESSURE: 0
ANAL BLEEDING: 0
VOICE CHANGE: 0
ABDOMINAL DISTENTION: 0
NAUSEA: 0
WHEEZING: 0
SORE THROAT: 0
BLOOD IN STOOL: 0
VOMITING: 0
EYES NEGATIVE: 1
RESPIRATORY NEGATIVE: 1
CONSTIPATION: 0

## 2022-05-06 NOTE — TELEPHONE ENCOUNTER
Patient called writer and let her know that Dr Feliciano Meza did want a stool test done and she can get supplies from any lab.

## 2022-05-06 NOTE — TELEPHONE ENCOUNTER
At checkout, pt given order for stool specimen. Pt states this was not discussed during her OV and wants to know if she actually needs to complete it. Please contact pt.

## 2022-05-06 NOTE — PROGRESS NOTES
GI CLINIC FOLLOW UP    INTERVAL HISTORY:   Maeve Wooten MD  [address] [facility] [address]    Chief Complaint   Patient presents with    Diarrhea     Patient complains of diarrhea. Th month of march was 4-6 time a day and not it is still happening but not often. She says it came whenever It is unpredictable. HISTORY OF PRESENT ILLNESS: Ms.Mary VIVIANA Damon is a 81 y.o. female referred for evaluation of chronic diarrhea. She has had issues since last summer. Episodic. An episode may last for a month or so.  3-5 bowel movements per day. Loose to watery. No blood. No cramping. Some urgency. Occasionally she will have a bowel movement at night. No clear triggers. No prior GI problems. In between episodes she is fine. She would have 1 bowel movement per day. No skin rash or joint swelling. No recent illness. No new medications. No prior abdominal surgeries. No sick contacts. No recent overseas travel. She reports 2 colonoscopies over the years. Most recent one process probably around 10 years ago. She reports some polyps were removed. No family history of IBD or GI pathology she has not tried anything for her diarrhea. She reports no smoking or drinking. Past Medical,Family, and Social History reviewed and does not contribute to the patient presenting condition. Patient's PMH/PSH,SH,PSYCH Hx, MEDs, ALLERGIES, and ROS were all reviewed and updated in the appropriate sections.     PAST MEDICAL HISTORY:  Past Medical History:   Diagnosis Date    Abnormal electrocardiography 10/29/2017    Acute coronary syndrome ([facility] [address] 10/29/2017    Ankle fracture, left 10/28/2016    Bilateral neck pain 12/28/2015    Costochondral chest pain 8/13/2021    Non-healing surgical wound 1/4/2017    Onychomycosis of toenail 7/27/2017    Open wound of ankle 12/21/2016       Past Surgical History:   Procedure Laterality Date    ANKLE FRACTURE SURGERY Left 10/18/2016 dr. Bert Houston Bilateral 2014    KNEE SURGERY Right 2015 approx    Dr. Liya Gabriel- replacement    [facility]    [facility]GERY [facility]    [facility]    THYROID [address] approx    goiter    TONSILLECTOMY  1956       CURRENT MEDICATIONS:    Current Outpatient Medications:     Omega-3 1000 MG CAPS, Take by mouth, Disp: , Rfl:     SYNTHROID 150 MCG tablet, TAKE 1 TABLET DAILY, Disp: 90 tablet, Rfl: 3    candesartan (ATACAND) 32 MG tablet, Take 32 mg by mouth daily, Disp: , Rfl:     amLODIPine (NORVASC) 2.5 MG tablet, Take 2.5 mg by mouth, Disp: , Rfl:     metoprolol tartrate (LOPRESSOR) 50 MG tablet, Take 1 tablet by mouth 2 times daily, Disp: 60 tablet, Rfl: 0    Multiple Vitamins-Minerals (MULTI FOR HER 50+) TABS, Take 1 tablet by mouth daily, Disp: , Rfl:     psyllium (METAMUCIL) 28 % packet, Take 1 packet by mouth daily Take with full glass of h20 or juice (Patient not taking: Reported on 5/6/2022), Disp: 1 each, Rfl: 0    ALLERGIES:   Allergies   Allergen Reactions    Lorcet [Hydrocodone-Acetaminophen] Other (See Comments)     Confusion    Oxycodone-Acetaminophen Nausea Only       FAMILY HISTORY:       Problem Relation Age of Onset    Heart Disease Mother     Heart Attack Mother     Heart Disease Father     Heart Attack Father     Diabetes type 2  Father     Cancer Other         multiple cancers on mom's side         SOCIAL HISTORY:   Social History     Socioeconomic History    Marital status: Unknown     Spouse name: Not on file    Number of children: Not on file    Years of education: Not on file    Highest education level: Not on file   Occupational History    Not on file   Tobacco Use    Smoking status: Never Smoker    Smokeless tobacco: Never Used   Substance and Sexual Activity    Alcohol use: Yes     Comment: ocassionally- few times a year    Drug use: No    Sexual activity: Not on file   Other Topics Concern    Not on file   Social History Narrative    Not on file     Social Determinants of Health     Financial Resource Strain: Low Risk     Difficulty of Paying Living Expenses: Not hard at all   Food Insecurity: No Food Insecurity    Worried About Running Out of Food in the Last Year: Never true    [address] in the Last Year: Never true   Transportation Needs:     Lack of Transportation (Medical): Not on file    Lack of Transportation (Non-Medical): Not on file   Physical Activity:     Days of Exercise per Week: Not on file    Minutes of Exercise per Session: Not on file   Stress:     Feeling of Stress : Not on file   Social Connections:     Frequency of Communication with Friends and Family: Not on file    Frequency of Social Gatherings with Friends and Family: Not on file    Attends Rastafarian Services: Not on file    Active Member of [address] [place] [address] or Organizations: Not on file    Attends Club or Organization Meetings: Not on file    Marital Status: Not on file   Intimate Partner Violence:     Fear of Current or Ex-Partner: Not on file    Emotionally Abused: Not on file    Physically Abused: Not on file    Sexually Abused: Not on file   Housing Stability:     Unable to Pay for Housing in the Last Year: Not on file    Number of [place] in the Last Year: Not on file    Unstable Housing in the Last Year: Not on file       REVIEW OF SYSTEMS: A 12-point review of systemswas obtained and pertinent positives and negatives were enumerated above in the history of present illness. All other reviewed systems / symptoms were negative. Review of Systems   Constitutional: Negative. Negative for appetite change, fatigue and unexpected weight change. HENT: Negative. Negative for dental problem, postnasal drip, sinus pressure, sore throat, trouble swallowing and voice change. Eyes: Negative. Negative for visual disturbance. Respiratory: Negative.   Negative for cough, choking and wheezing. Cardiovascular: Negative. Negative for chest pain, palpitations and leg swelling. Gastrointestinal: Positive for diarrhea. Negative for abdominal distention, abdominal pain, anal bleeding, blood in stool, constipation, nausea, rectal pain and vomiting. Endocrine: Negative. Genitourinary: Negative. Negative for difficulty urinating. Musculoskeletal: Negative. Negative for arthralgias, back pain, gait problem and myalgias. Allergic/Immunologic: Negative. Negative for environmental allergies and food allergies. Neurological: Negative. Negative for dizziness, weakness, light-headedness, numbness and headaches. Hematological: Bruises/bleeds easily. Psychiatric/Behavioral: Negative. Negative for sleep disturbance. The patient is not nervous/anxious. LABORATORY DATA: Reviewed  No results found for: WBC, HGB, HCT, MCV, PLT, NA, K, CL, CO2, BUN, CREATININE, LABPROT, LABALBU, GGT, BILITOT, ALKPHOS, AST, ALT, INR      No results found for: RBC, HGB, MCV, MCH, MCHC, RDW, MPV, BASOPCT, LYMPHSABS, MONOSABS, NEUTROABS, EOSABS, BASOSABS      DIAGNOSTIC TESTING:     No results found. PHYSICAL EXAMINATION: Vital signs reviewed per the nursing documentation. There were no vitals taken for this visit. There is no height or weight on file to calculate BMI. Physical Exam      I personally reviewed the nurse's notes and documentation and I agree with her notes. General: alert, appears stated age and cooperative Psych: Normal. and Alert and oriented, appropriate affect. . Normal affect. Mentation normal  HEENT: PERRLA. Clear conjunctivae and sclerae. Moist oral mucosae, no lesions or ulcers. The neck is supple, without lymphadenopathy or jugular venous distension. No masses. Normal thyroid. Cardiovascular: S1 S2 RRR no rubs or murmurs. Pulmonary: clear BL. No accessory muscle usage. Abdominal Exam: Soft, NT ND, no hepato or spleno megaly, +BS, no ascites.   No groin masses or lymphadenopathy. Extremities: no lower ext edema. Skin: Warm skin. No skin rash. No spider nevi palmar erythema nail dystrophy. Joint: No joint swelling or deformity. Neurological: intact sensory. DTR+. IMPRESSION: Ms. Madelyn Delaney is a 81 y.o. female with chronic diarrhea. Intermittent. We discussed differential diagnosis and work-up. We will start with align daily. We will check stool calprotectin level. We discussed option of colonoscopy now versus waiting. The patient prefers to wait. In case her symptoms do not resolve with align we will arrange for colonoscopy to exclude inflammatory process. Follow-up in 2 months. Thank you for allowing me to participate in the care of Ms. Damon. For any further questions please do not hesitate to contact me. I have reviewed and agree with the ROS entered by the MA/LPN. Note is dictated utilizing voice recognition software. Unfortunately this leads to occasional typographical errors. Please contact our office if you have any questions.     Ilsa Aase, MD  [facility]  O: #[phone]

## 2022-05-31 NOTE — TELEPHONE ENCOUNTER
Patient had stool test at [place] [address] [address]  Interpretive Information:  <50.0mg/kg  -  Normal    50.0-120. mg/kg -  Borderline   reevaluate in 4-6 weeks  >120 mg/kg  - Abnormal

## 2022-06-13 DIAGNOSIS — K52.9 CHRONIC DIARRHEA: ICD-10-CM

## 2022-06-28 ENCOUNTER — TELEPHONE (OUTPATIENT)
Dept: GASTROENTEROLOGY | Age: 87
End: 2022-06-28

## 2022-07-14 ENCOUNTER — OFFICE VISIT (OUTPATIENT)
Dept: GASTROENTEROLOGY | Age: 87
End: 2022-07-14
Payer: MEDICARE

## 2022-07-14 VITALS
WEIGHT: 122 LBS | DIASTOLIC BLOOD PRESSURE: 71 MMHG | TEMPERATURE: 97.8 F | SYSTOLIC BLOOD PRESSURE: 154 MMHG | BODY MASS INDEX: 21.61 KG/M2

## 2022-07-14 DIAGNOSIS — K63.5 POLYP OF COLON, UNSPECIFIED PART OF COLON, UNSPECIFIED TYPE: ICD-10-CM

## 2022-07-14 DIAGNOSIS — K58.0 IRRITABLE BOWEL SYNDROME WITH DIARRHEA: ICD-10-CM

## 2022-07-14 DIAGNOSIS — R19.7 DIARRHEA, UNSPECIFIED TYPE: Primary | ICD-10-CM

## 2022-07-14 PROCEDURE — 1123F ACP DISCUSS/DSCN MKR DOCD: CPT | Performed by: INTERNAL MEDICINE

## 2022-07-14 PROCEDURE — 99214 OFFICE O/P EST MOD 30 MIN: CPT | Performed by: INTERNAL MEDICINE

## 2022-07-14 RX ORDER — BISACODYL 5 MG
TABLET, DELAYED RELEASE (ENTERIC COATED) ORAL
Qty: 4 TABLET | Refills: 0 | Status: SHIPPED | OUTPATIENT
Start: 2022-07-14

## 2022-07-14 RX ORDER — POLYETHYLENE GLYCOL 3350 17 G/17G
POWDER, FOR SOLUTION ORAL
Qty: 238 G | Refills: 0 | Status: SHIPPED | OUTPATIENT
Start: 2022-07-14

## 2022-07-14 ASSESSMENT — ENCOUNTER SYMPTOMS
ABDOMINAL DISTENTION: 0
BLOOD IN STOOL: 0
VOMITING: 0
ANAL BLEEDING: 0
NAUSEA: 0
RECTAL PAIN: 0
SORE THROAT: 0
CHOKING: 0
DIARRHEA: 1
ABDOMINAL PAIN: 1
TROUBLE SWALLOWING: 0
SHORTNESS OF BREATH: 0
WHEEZING: 0
COUGH: 0
CONSTIPATION: 0
VOICE CHANGE: 0

## 2022-07-14 NOTE — PROGRESS NOTES
GI CLINIC FOLLOW UP    NTERVAL HISTORY:   No referring provider defined for this encounter. Chief Complaint   Patient presents with    Results     Pt is here today for a f/u on lab results, prev Dr Radha Miller pt last seen on 05/06/22 for diarrhea. 1. Diarrhea, unspecified type    2. Irritable bowel syndrome with diarrhea    3. Polyp of colon, unspecified part of colon, unspecified type        This patient is seen in my office for the first time  She has been seen and followed by my partner who has left the practice now    It appears this elderly female patient history significant for chronic intermittent diarrhea    She was evaluated by my partner in the past was ordered to have calprotectin level which were normal now    She was given probiotics she is feeling little bit better passing 1-2 bowel movements a day  She seems to be concerned about her diarrhea    Apparently has history for colon polyps as well  Denies any rectal bleeding denies any melanotic stools    Any weight loss loss of appetite    Appears to have some stress and anxiety issues    No known family history for colon cancer  Has not had a colonoscopy done more than 10 years  Previous records were reviewed with her          HISTORY OF PRESENT ILLNESS: Ms.Mary VIVIANA Damon is a 81 y.o. female with a past history remarkable for , referred for evaluation of   Chief Complaint   Patient presents with    Results     Pt is here today for a f/u on lab results, prev Dr Radha Miller pt last seen on 05/06/22 for diarrhea. .    Past Medical,Family, and Social History reviewed and does contribute to the patient presenting condition. Patient's PMH/PSH,SH,PSYCH Hx, MEDs, ALLERGIES, and ROS were all reviewed and updated in the appropriate sections.     PAST MEDICAL HISTORY:  Past Medical History:   Diagnosis Date    Abnormal electrocardiography 10/29/2017    Acute coronary syndrome ([facility] [address] 10/29/2017    Ankle fracture, left 10/28/2016    Bilateral neck pain 12/28/2015    Costochondral chest pain 8/13/2021    Non-healing surgical wound 1/4/2017    Onychomycosis of toenail 7/27/2017    Open wound of ankle 12/21/2016       Past Surgical History:   Procedure Laterality Date    ANKLE FRACTURE SURGERY Left 10/18/2016    dr. Jadyn Hearn Bilateral 2014    KNEE SURGERY Right 2015 approx    Dr. Ludmila Caldwell- replacement    [facility]    [facility]    THYROID [address] approx    goiter    TONSILLECTOMY  1956       CURRENT MEDICATIONS:    Current Outpatient Medications:     Omega-3 1000 MG CAPS, Take by mouth, Disp: , Rfl:     SYNTHROID 150 MCG tablet, TAKE 1 TABLET DAILY, Disp: 90 tablet, Rfl: 3    candesartan (ATACAND) 32 MG tablet, Take 32 mg by mouth daily, Disp: , Rfl:     amLODIPine (NORVASC) 2.5 MG tablet, Take 2.5 mg by mouth, Disp: , Rfl:     metoprolol tartrate (LOPRESSOR) 50 MG tablet, Take 1 tablet by mouth 2 times daily, Disp: 60 tablet, Rfl: 0    Multiple Vitamins-Minerals (MULTI FOR HER 50+) TABS, Take 1 tablet by mouth daily, Disp: , Rfl:     polyethylene glycol (GLYCOLAX) 17 GM/SCOOP powder, Follow Instructions provided by physician's office, Disp: 238 g, Rfl: 0    bisacodyl (BISACODYL) 5 MG EC tablet, Take 4 tablets in the morning, Disp: 4 tablet, Rfl: 0    ALLERGIES:   Allergies   Allergen Reactions    Lorcet [Hydrocodone-Acetaminophen] Other (See Comments)     Confusion    Oxycodone-Acetaminophen Nausea Only       FAMILY HISTORY:       Problem Relation Age of Onset    Heart Disease Mother     Heart Attack Mother     Heart Disease Father     Heart Attack Father     Diabetes type 2  Father     Cancer Other         multiple cancers on mom's side         SOCIAL HISTORY:   Social History     Socioeconomic History    Marital status: Unknown     Spouse name: Not on file    Number of children: Not on file    Years of education: for cough, choking, shortness of breath and wheezing. Cardiovascular: Negative for chest pain, palpitations and leg swelling. Gastrointestinal: Positive for abdominal pain (lower abd) and diarrhea. Negative for abdominal distention, anal bleeding, blood in stool, constipation, nausea, rectal pain and vomiting. Neurological: Negative for dizziness, weakness, light-headedness, numbness and headaches. Hematological: Does not bruise/bleed easily. Psychiatric/Behavioral: Negative for confusion and sleep disturbance. The patient is not nervous/anxious. PHYSICAL EXAMINATION: Vital signs reviewed per the nursing documentation. BP (!) 154/71   Temp 97.8 °F (36.6 °C)   Wt 122 lb (55.3 kg)   BMI 21.61 kg/m²   Body mass index is 21.61 kg/m². Physical Exam  Nursing note reviewed. Constitutional:       Appearance: She is well-developed. Comments: Anxious    HENT:      Head: Normocephalic and atraumatic. Eyes:      Conjunctiva/sclera: Conjunctivae normal.      Pupils: Pupils are equal, round, and reactive to light. Cardiovascular:      Heart sounds: Normal heart sounds. Pulmonary:      Effort: Pulmonary effort is normal.      Breath sounds: Normal breath sounds. Abdominal:      General: Bowel sounds are normal.      Palpations: Abdomen is soft. Comments: NON TENDER, NON DISTENTED  LIVER SPLEEN AND HERNIAS ARE NOT  PALPABLE  BOWEL SOUNDS ARE POSITIVE      Musculoskeletal:         General: Normal range of motion. Cervical back: Normal range of motion and neck supple. Skin:     General: Skin is warm. Neurological:      Mental Status: She is alert and oriented to person, place, and time.    Psychiatric:         Behavior: Behavior normal.           LABORATORY DATA: Reviewed  No results found for: WBC, HGB, HCT, MCV, PLT, NA, K, CL, CO2, BUN, CREATININE, LABPROT, LABALBU, GGT, BILITOT, ALKPHOS, AST, ALT, INR      No results found for: RBC, HGB, MCV, MCH, MCHC, RDW, MPV, BASOPCT, counseling about lifestyle and dietary modifications  Patient's  questions were answered in this regard as well  The patient has verbalized understanding and agreement         Thank you for allowing me to participate in the care of Ms. Damon. For any further questions please do not hesitate to contact me. I have reviewed and agree with the ROS entered by the MA/Nurse.          Halie Rashid MD, [facility]  Board Certified in Gastroenterology and [address] [place] [address] Gastroenterology  Office #: [phone]

## 2022-07-20 ENCOUNTER — TELEPHONE (OUTPATIENT)
Dept: GASTROENTEROLOGY | Age: 87
End: 2022-07-20

## 2022-07-21 NOTE — TELEPHONE ENCOUNTER
Returned call to the patient and LVM to call the office back if she would like to r/s. Procedure cancelled with Tommy Thomas at [facility].

## 2022-08-11 ENCOUNTER — OFFICE VISIT (OUTPATIENT)
Dept: PRIMARY CARE CLINIC | Age: 87
End: 2022-08-11
Payer: MEDICARE

## 2022-08-11 VITALS
OXYGEN SATURATION: 98 % | BODY MASS INDEX: 21.36 KG/M2 | HEART RATE: 73 BPM | SYSTOLIC BLOOD PRESSURE: 144 MMHG | DIASTOLIC BLOOD PRESSURE: 82 MMHG | WEIGHT: 120.6 LBS

## 2022-08-11 DIAGNOSIS — I10 ESSENTIAL HYPERTENSION: Primary | ICD-10-CM

## 2022-08-11 DIAGNOSIS — G56.01 CARPAL TUNNEL SYNDROME OF RIGHT WRIST: ICD-10-CM

## 2022-08-11 DIAGNOSIS — E03.9 HYPOTHYROIDISM, UNSPECIFIED TYPE: ICD-10-CM

## 2022-08-11 DIAGNOSIS — R55 VASOVAGAL SYNCOPE: ICD-10-CM

## 2022-08-11 PROCEDURE — 99213 OFFICE O/P EST LOW 20 MIN: CPT | Performed by: FAMILY MEDICINE

## 2022-08-11 PROCEDURE — 1123F ACP DISCUSS/DSCN MKR DOCD: CPT | Performed by: FAMILY MEDICINE

## 2022-08-11 ASSESSMENT — PATIENT HEALTH QUESTIONNAIRE - PHQ9
SUM OF ALL RESPONSES TO PHQ9 QUESTIONS 1 & 2: 0
SUM OF ALL RESPONSES TO PHQ QUESTIONS 1-9: 0
2. FEELING DOWN, DEPRESSED OR HOPELESS: 0
SUM OF ALL RESPONSES TO PHQ QUESTIONS 1-9: 0
1. LITTLE INTEREST OR PLEASURE IN DOING THINGS: 0
SUM OF ALL RESPONSES TO PHQ QUESTIONS 1-9: 0
SUM OF ALL RESPONSES TO PHQ QUESTIONS 1-9: 0

## 2022-08-11 ASSESSMENT — ENCOUNTER SYMPTOMS
SHORTNESS OF BREATH: 0
CHEST TIGHTNESS: 0
NAUSEA: 0
WHEEZING: 0
COUGH: 0

## 2022-08-11 NOTE — PROGRESS NOTES
[address] [facility]  [address] [facility] [address] [address]  Dept: [address] is a 81 y.o. female Established patient, who presents today for her medical conditions/complaints as noted below. Chief Complaint   Patient presents with    Hypertension     Patient checks B/P at home    Hypothyroidism       HPI:     HPI  Cardio changed amlodopine to 5 mg 5 days ago, been feeling \"crappy. \" Suspects cardio neurogenic syncope, switching amlodopine to 1 at noon and 1 at dinner time. 1 syncopal episode at the end of June, bp was \"all over the place. \" Did not strike head,  caught her.      Reviewed prior notes: Cardiology   Reviewed previous:  Labs and Imaging    LDL Calculated (mg/dL)   Date Value   08/14/2021 80   10/25/2018 84   03/02/2017 96       (goal LDL is <100)   Hemoglobin A1C (%)   Date Value   10/27/2020 5.8     BP Readings from Last 3 Encounters:   08/11/22 (!) 144/82   07/14/22 (!) 154/71   05/06/22 131/74          (goal 120/80)    Past Medical History:   Diagnosis Date    Abnormal electrocardiography 10/29/2017    Acute coronary syndrome ([address] 10/29/2017    Ankle fracture, left 10/28/2016    Bilateral neck pain 12/28/2015    Costochondral chest pain 8/13/2021    Non-healing surgical wound 1/4/2017    Onychomycosis of toenail 7/27/2017    Open wound of ankle 12/21/2016      Past Surgical History:   Procedure Laterality Date    ANKLE FRACTURE SURGERY Left 10/18/2016    dr. Mercedes Dougherty Bilateral 2014    KNEE SURGERY Right 2015 approx    Dr. Beth Harris- replacement    SHOULDER ARTHROSCOPY Left     [facility]    SHOULDER SURGERY Left 2015    [facility]    THYROID SURGERY  1960 approx    goiter    TONSILLECTOMY  1956       Family History   Problem Relation Age of Onset    Heart Disease Mother     Heart Attack Mother     Heart Disease Father     Heart Attack Father     Diabetes type 2  Father     Cancer Other         multiple cancers on mom's side       Social History     Tobacco Use    Smoking status: Never    Smokeless tobacco: Never   Substance Use Topics    Alcohol use: Yes     Comment: ocassionally- few times a year      Current Outpatient Medications   Medication Sig Dispense Refill    Probiotic Product (ALIGN PO) Take by mouth      Omega-3 1000 MG CAPS Take by mouth      SYNTHROID 150 MCG tablet TAKE 1 TABLET DAILY 90 tablet 3    candesartan (ATACAND) 32 MG tablet Take 32 mg by mouth daily      amLODIPine (NORVASC) 2.5 MG tablet Take 2.5 mg by mouth Patient is taking 2.5 mg BID (5 mg daily)      metoprolol tartrate (LOPRESSOR) 50 MG tablet Take 1 tablet by mouth 2 times daily 60 tablet 0    Multiple Vitamins-Minerals (MULTI FOR HER 50+) TABS Take 1 tablet by mouth daily      polyethylene glycol (GLYCOLAX) 17 GM/SCOOP powder Follow Instructions provided by physician's office (Patient not taking: Reported on 8/11/2022) 238 g 0    bisacodyl (BISACODYL) 5 MG EC tablet Take 4 tablets in the morning (Patient not taking: Reported on 8/11/2022) 4 tablet 0     No current facility-administered medications for this visit. Allergies   Allergen Reactions    Lorcet [Hydrocodone-Acetaminophen] Other (See Comments)     Confusion    Oxycodone-Acetaminophen Nausea Only       Health Maintenance   Topic Date Due    DTaP/Tdap/Td vaccine (1 - Tdap) Never done    COVID-19 Vaccine (4 - Booster for Pfizer series) 02/14/2022    Flu vaccine (1) 09/01/2022    Depression Screen  12/13/2022    Annual Wellness Visit (AWV)  12/14/2022    Shingles vaccine  Completed    Pneumococcal 65+ years Vaccine  Completed    Hepatitis A vaccine  Aged Out    Hepatitis B vaccine  Aged Out    Hib vaccine  Aged Out    Meningococcal (ACWY) vaccine  Aged Out       Subjective:      Review of Systems   Constitutional:  Positive for fatigue (\"major\"). Negative for chills and fever.    Respiratory:  Negative for cough, chest tightness, shortness of breath and wheezing. Cardiovascular:  Negative for chest pain, palpitations and leg swelling. Gastrointestinal:  Negative for nausea. Neurological:  Positive for dizziness (weekly), syncope (one episode in june), weakness (minimal in legs), light-headedness (weekly) and numbness (first 4 digits of right hand). Negative for tremors, speech difficulty and headaches. Hematological:  Bruises/bleeds easily. Objective:     BP (!) 144/82   Pulse 73   Wt 120 lb 9.6 oz (54.7 kg)   SpO2 98%   BMI 21.36 kg/m²   Physical Exam  Constitutional:       Appearance: Normal appearance. She is normal weight. HENT:      Head: Normocephalic and atraumatic. Cardiovascular:      Rate and Rhythm: Normal rate and regular rhythm. Heart sounds: Normal heart sounds. Pulmonary:      Effort: Pulmonary effort is normal. No respiratory distress. Breath sounds: Normal breath sounds. No wheezing, rhonchi or rales. Neurological:      General: No focal deficit present. Mental Status: She is alert and oriented to person, place, and time. Psychiatric:         Mood and Affect: Mood normal.         Behavior: Behavior normal.       Assessment/Plan:   1. Essential hypertension  -     Comprehensive Metabolic Panel; Future  -     CBC with Auto Differential; Future  -     Magnesium; Future  -     TSH; Future  2. Hypothyroidism, unspecified type  -     Comprehensive Metabolic Panel; Future  -     CBC with Auto Differential; Future  -     Magnesium; Future  -     TSH; Future  3. Vasovagal syncope  -     Comprehensive Metabolic Panel; Future  -     CBC with Auto Differential; Future  -     Magnesium; Future  -     TSH; Future  4. Carpal tunnel syndrome of right wrist  Assessment & Plan:   Uncontrolled, brace and if not better will inject and then may need surgery  Orders:  -     Wrist splint     Return in about 3 months (around 11/11/2022) for medication f/u, HTN f/u.   NV to check BP cuff     Orders Placed This Encounter   Procedures Wrist splint     Scheduling Instructions:      Right cock up wrist splint    Comprehensive Metabolic Panel     Standing Status:   Future     Standing Expiration Date:   8/12/2023    CBC with Auto Differential     Standing Status:   Future     Standing Expiration Date:   8/12/2023    Magnesium     Standing Status:   Future     Standing Expiration Date:   8/11/2023    TSH     Standing Status:   Future     Standing Expiration Date:   8/12/2023     No orders of the defined types were placed in this encounter. Patient given educational materials - see patient instructions. Discussed use, benefit, and side effects of prescribed medications. All patient questions answered. Pt voiced understanding. Reviewed health maintenance. Instructed to continue current medications, diet and exercise. Patient agreed with treatment plan. Follow up as directed.      Electronically signed by Lucero King MD on 8/11/2022 at 11:02 AM

## 2022-08-15 ENCOUNTER — NURSE ONLY (OUTPATIENT)
Dept: PRIMARY CARE CLINIC | Age: 87
End: 2022-08-15

## 2022-08-15 VITALS — DIASTOLIC BLOOD PRESSURE: 70 MMHG | SYSTOLIC BLOOD PRESSURE: 142 MMHG

## 2022-08-15 DIAGNOSIS — Z01.30 BLOOD PRESSURE CHECK: Primary | ICD-10-CM

## 2022-08-15 NOTE — PROGRESS NOTES
Patient is here today for BP check. Patient states she checks BP twice daily. Patient believes readings are doing better. Patient states she feels fine. Patient denies SOB,headache, light headed and dizziness. Case discussed with , per PCP continue same regimen and continue to monitor BP. Patient understands.

## 2022-08-16 LAB
ALBUMIN SERPL-MCNC: NORMAL G/DL
ALP BLD-CCNC: NORMAL U/L
ALT SERPL-CCNC: NORMAL U/L
ANION GAP SERPL CALCULATED.3IONS-SCNC: NORMAL MMOL/L
AST SERPL-CCNC: NORMAL U/L
BASOPHILS ABSOLUTE: NORMAL
BASOPHILS RELATIVE PERCENT: NORMAL
BILIRUB SERPL-MCNC: NORMAL MG/DL
BUN BLDV-MCNC: NORMAL MG/DL
CALCIUM SERPL-MCNC: NORMAL MG/DL
CHLORIDE BLD-SCNC: NORMAL MMOL/L
CO2: NORMAL
CREAT SERPL-MCNC: NORMAL MG/DL
EOSINOPHILS ABSOLUTE: NORMAL
EOSINOPHILS RELATIVE PERCENT: NORMAL
GFR CALCULATED: NORMAL
GLUCOSE BLD-MCNC: 118 MG/DL
HCT VFR BLD CALC: NORMAL %
HEMOGLOBIN: NORMAL
LYMPHOCYTES ABSOLUTE: NORMAL
LYMPHOCYTES RELATIVE PERCENT: NORMAL
MCH RBC QN AUTO: NORMAL PG
MCHC RBC AUTO-ENTMCNC: NORMAL G/DL
MCV RBC AUTO: NORMAL FL
MONOCYTES ABSOLUTE: NORMAL
MONOCYTES RELATIVE PERCENT: NORMAL
NEUTROPHILS ABSOLUTE: NORMAL
NEUTROPHILS RELATIVE PERCENT: NORMAL
PDW BLD-RTO: NORMAL %
PLATELET # BLD: NORMAL 10*3/UL
PMV BLD AUTO: NORMAL FL
POTASSIUM SERPL-SCNC: NORMAL MMOL/L
RBC # BLD: NORMAL 10*6/UL
SODIUM BLD-SCNC: NORMAL MMOL/L
TOTAL PROTEIN: NORMAL
WBC # BLD: NORMAL 10*3/UL

## 2022-08-17 DIAGNOSIS — R55 VASOVAGAL SYNCOPE: ICD-10-CM

## 2022-08-17 DIAGNOSIS — I10 ESSENTIAL HYPERTENSION: ICD-10-CM

## 2022-08-17 DIAGNOSIS — E03.9 HYPOTHYROIDISM, UNSPECIFIED TYPE: ICD-10-CM

## 2022-08-18 NOTE — RESULT ENCOUNTER NOTE
Adv pt results okay- about the same as her usual.  Sodium is slightly lower so may want to just not worry about limiting her salt intake.

## 2022-11-11 ENCOUNTER — OFFICE VISIT (OUTPATIENT)
Dept: PRIMARY CARE CLINIC | Age: 87
End: 2022-11-11
Payer: MEDICARE

## 2022-11-11 VITALS
SYSTOLIC BLOOD PRESSURE: 132 MMHG | WEIGHT: 121.4 LBS | HEART RATE: 46 BPM | BODY MASS INDEX: 21.51 KG/M2 | DIASTOLIC BLOOD PRESSURE: 76 MMHG | OXYGEN SATURATION: 98 %

## 2022-11-11 DIAGNOSIS — G56.01 CARPAL TUNNEL SYNDROME OF RIGHT WRIST: ICD-10-CM

## 2022-11-11 DIAGNOSIS — I10 ESSENTIAL HYPERTENSION: ICD-10-CM

## 2022-11-11 DIAGNOSIS — E87.1 HYPONATREMIA: ICD-10-CM

## 2022-11-11 PROCEDURE — 99213 OFFICE O/P EST LOW 20 MIN: CPT | Performed by: FAMILY MEDICINE

## 2022-11-11 PROCEDURE — 1123F ACP DISCUSS/DSCN MKR DOCD: CPT | Performed by: FAMILY MEDICINE

## 2022-11-11 SDOH — ECONOMIC STABILITY: FOOD INSECURITY: WITHIN THE PAST 12 MONTHS, YOU WORRIED THAT YOUR FOOD WOULD RUN OUT BEFORE YOU GOT MONEY TO BUY MORE.: NEVER TRUE

## 2022-11-11 SDOH — ECONOMIC STABILITY: FOOD INSECURITY: WITHIN THE PAST 12 MONTHS, THE FOOD YOU BOUGHT JUST DIDN'T LAST AND YOU DIDN'T HAVE MONEY TO GET MORE.: NEVER TRUE

## 2022-11-11 ASSESSMENT — SOCIAL DETERMINANTS OF HEALTH (SDOH): HOW HARD IS IT FOR YOU TO PAY FOR THE VERY BASICS LIKE FOOD, HOUSING, MEDICAL CARE, AND HEATING?: NOT HARD AT ALL

## 2022-11-11 ASSESSMENT — ENCOUNTER SYMPTOMS
WHEEZING: 0
SORE THROAT: 0
VOMITING: 0
EYE REDNESS: 0
DIARRHEA: 0
RHINORRHEA: 0
NAUSEA: 0
ABDOMINAL PAIN: 0
EYE DISCHARGE: 0
COUGH: 0
SHORTNESS OF BREATH: 0

## 2022-11-11 NOTE — ASSESSMENT & PLAN NOTE
refer to specialist as pt is not having feeling in her fingers- can't pick things up because she can't feel it.

## 2022-11-11 NOTE — PROGRESS NOTES
[facility] PRIMARY CARE  [address] [place] [id number]  Dept: 506 Jae Lee is a 81 y.o. female Established patient, who presents today for her medical conditions/complaints as noted below. Chief Complaint   Patient presents with    Wrist Pain     Patient c/o right wrist pain. Patient has been using a wrist brace but not getting any relief. Hypertension     Patient checks B/P at home    Hypothyroidism       HPI:     HPI- BP at home has been doing okay. Brace for wrist did not really help.       Reviewed prior notes: None   Reviewed previous:   na    LDL Calculated (mg/dL)   Date Value   08/14/2021 80   10/25/2018 84   03/02/2017 96       (goal LDL is <100)   Hemoglobin A1C (%)   Date Value   10/27/2020 5.8     BP Readings from Last 3 Encounters:   11/11/22 132/76   08/15/22 (!) 142/70   08/11/22 (!) 144/82          (goal 120/80)    Past Medical History:   Diagnosis Date    Abnormal electrocardiography 10/29/2017    Acute coronary syndrome ([address] 10/29/2017    Ankle fracture, left 10/28/2016    Bilateral neck pain 12/28/2015    Costochondral chest pain 8/13/2021    Non-healing surgical wound 1/4/2017    Onychomycosis of toenail 7/27/2017    Open wound of ankle 12/21/2016      Past Surgical History:   Procedure Laterality Date    ANKLE FRACTURE SURGERY Left 10/18/2016    dr. Jason Martin Bilateral 2014    KNEE SURGERY Right 2015 approx    Dr. Dacia Quinonez- replacement    SHOULDER ARTHROSCOPY Left     assenmacher    SHOULDER SURGERY Left 2015    assenmacher    THYROID SURGERY  1960 approx    goiter    TONSILLECTOMY  1956       Family History   Problem Relation Age of Onset    Heart Disease Mother     Heart Attack Mother     Heart Disease Father     Heart Attack Father     Diabetes type 2  Father     Cancer Other         multiple cancers on mom's side       Social History     Tobacco Use    Smoking status: Never    Smokeless tobacco: Never   Substance Use Topics    Alcohol use: Yes     Comment: ocassionally- few times a year      Current Outpatient Medications   Medication Sig Dispense Refill    Probiotic Product (ALIGN PO) Take by mouth      Omega-3 1000 MG CAPS Take by mouth      SYNTHROID 150 MCG tablet TAKE 1 TABLET DAILY 90 tablet 3    candesartan (ATACAND) 32 MG tablet Take 32 mg by mouth daily      amLODIPine (NORVASC) 2.5 MG tablet Take 2.5 mg by mouth Patient is taking 2.5 mg BID (5 mg daily)      metoprolol tartrate (LOPRESSOR) 50 MG tablet Take 1 tablet by mouth 2 times daily 60 tablet 0    Multiple Vitamins-Minerals (MULTI FOR HER 50+) TABS Take 1 tablet by mouth daily      polyethylene glycol (GLYCOLAX) 17 GM/SCOOP powder Follow Instructions provided by physician's office (Patient not taking: Reported on 11/11/2022) 238 g 0    bisacodyl (BISACODYL) 5 MG EC tablet Take 4 tablets in the morning (Patient not taking: Reported on 11/11/2022) 4 tablet 0     No current facility-administered medications for this visit. Allergies   Allergen Reactions    Lorcet [Hydrocodone-Acetaminophen] Other (See Comments)     Confusion    Oxycodone-Acetaminophen Nausea Only       Health Maintenance   Topic Date Due    COVID-19 Vaccine (4 - Booster for Pfizer series) 12/09/2021    DTaP/Tdap/Td vaccine (1 - Tdap) 11/11/2023 (Originally 7/26/1954)    Flu vaccine (1) 11/11/2023 (Originally 8/1/2022)    Annual Wellness Visit (AWV)  12/14/2022    Depression Screen  08/11/2023    Shingles vaccine  Completed    Pneumococcal 65+ years Vaccine  Completed    Hepatitis A vaccine  Aged Out    Hib vaccine  Aged Out    Meningococcal (ACWY) vaccine  Aged Out       Subjective:      Review of Systems   Constitutional:  Negative for chills and fever. HENT:  Negative for rhinorrhea and sore throat. Eyes:  Negative for discharge and redness. Respiratory:  Negative for cough, shortness of breath and wheezing.     Cardiovascular:  Negative for chest pain and palpitations. Gastrointestinal:  Negative for abdominal pain, diarrhea, nausea and vomiting. Genitourinary:  Negative for dysuria and frequency. Musculoskeletal:  Negative for arthralgias and myalgias. Neurological:  Positive for light-headedness (had one episode, but was able to sit down. ). Negative for dizziness and headaches. Psychiatric/Behavioral:  Positive for sleep disturbance. Objective:     /76   Pulse (!) 46   Wt 121 lb 6.4 oz (55.1 kg)   SpO2 98%   BMI 21.51 kg/m²   Physical Exam  Vitals and nursing note reviewed. Constitutional:       General: She is not in acute distress. Appearance: She is well-developed. She is not ill-appearing. HENT:      Head: Normocephalic and atraumatic. Right Ear: External ear normal.      Left Ear: External ear normal.   Eyes:      General: No scleral icterus. Right eye: No discharge. Left eye: No discharge. Conjunctiva/sclera: Conjunctivae normal.   Neck:      Thyroid: No thyromegaly. Trachea: No tracheal deviation. Cardiovascular:      Rate and Rhythm: Normal rate and regular rhythm. Heart sounds: Normal heart sounds. Pulmonary:      Effort: Pulmonary effort is normal. No respiratory distress. Breath sounds: Normal breath sounds. No wheezing. Musculoskeletal:      Comments: + Tinnels and Phalens- right hand   Lymphadenopathy:      Cervical: No cervical adenopathy. Skin:     General: Skin is warm. Findings: No rash. Neurological:      Mental Status: She is alert and oriented to person, place, and time. Psychiatric:         Mood and Affect: Mood normal.         Behavior: Behavior normal.         Thought Content: Thought content normal.       Assessment/Plan:   1. Essential hypertension  Assessment & Plan:   Well-controlled, continue current medications  2. Hyponatremia  3.  Carpal tunnel syndrome of right wrist  Assessment & Plan:   refer to specialist as pt is not having feeling in her fingers- can't pick things up because she can't feel it. Orders:  -     Miguelangel Armendariz MD, Orthopedic Surgery ([facility]     Return in about 2 months (around 1/11/2023) for HTN f/u, MWV. Data Unavailable     Orders Placed This Encounter   Procedures    Miguelangel Armendariz MD, Orthopedic Surgery ([facility]), [place]     Referral Priority:   Routine     Referral Type:   Eval and Treat     Referral Reason:   Specialty Services Required     Referred to Provider:   Klarissa Baron MD     Requested Specialty:   Orthopedic Surgery     Number of Visits Requested:   1       No orders of the defined types were placed in this encounter. Patient given educational materials - see patient instructions. Discussed use, benefit, and side effects of prescribed medications. All patient questions answered. Pt voiced understanding. Reviewed health maintenance. Instructed to continue current medications, diet and exercise. Patient agreed with treatment plan. Follow up as directed.      Electronically signed by Yisel Gonsalez MD on 11/11/2022 at 10:33 AM

## 2022-11-30 ENCOUNTER — OFFICE VISIT (OUTPATIENT)
Dept: ORTHOPEDIC SURGERY | Age: 87
End: 2022-11-30

## 2022-11-30 VITALS — BODY MASS INDEX: 21.44 KG/M2 | RESPIRATION RATE: 14 BRPM | WEIGHT: 121 LBS | HEIGHT: 63 IN

## 2022-11-30 DIAGNOSIS — M25.531 RIGHT WRIST PAIN: ICD-10-CM

## 2022-11-30 DIAGNOSIS — G56.01 CARPAL TUNNEL SYNDROME OF RIGHT WRIST: Primary | ICD-10-CM

## 2022-11-30 NOTE — PROGRESS NOTES
ORTHOPEDIC PATIENT EVALUATION      HPI / Chief Complaint  Ezequiel Young is a 81 y.o. right-hand-dominant female who presents for evaluation of her right hand. She indicates that for the past 4 to 5 months she has been dealing with constant numbness and tingling involving her thumb, index finger, middle finger and ring finger. This is associated with weakness i.e. she drops things with his hand. She also reports some intermittent pain involving the distal phalanx of her thumb. She denies any precipitating trauma or injury. She is attempted treatment with wearing a wrist brace without any improvement in her symptoms. Past Medical History  Bertrand Castro  has a past medical history of Abnormal electrocardiography, Acute coronary syndrome ([address] Ankle fracture, left, Bilateral neck pain, Costochondral chest pain, Non-healing surgical wound, Onychomycosis of toenail, and Open wound of ankle. Past Surgical History  Bertrand Castro  has a past surgical history that includes knee surgery (Right, 2015 approx); Ankle fracture surgery (Left, 10/18/2016); Tonsillectomy (1956); Thyroid surgery (1960 approx); Shoulder arthroscopy (Left); shoulder surgery (Left, 2015); and Cataract removal with implant (Bilateral, 2014).     Current Medications  Current Outpatient Medications   Medication Sig Dispense Refill    Probiotic Product (ALIGN PO) Take by mouth      polyethylene glycol (GLYCOLAX) 17 GM/SCOOP powder Follow Instructions provided by physician's office (Patient not taking: Reported on 11/11/2022) 238 g 0    bisacodyl (BISACODYL) 5 MG EC tablet Take 4 tablets in the morning (Patient not taking: Reported on 11/11/2022) 4 tablet 0    Omega-3 1000 MG CAPS Take by mouth      SYNTHROID 150 MCG tablet TAKE 1 TABLET DAILY 90 tablet 3    candesartan (ATACAND) 32 MG tablet Take 32 mg by mouth daily      amLODIPine (NORVASC) 2.5 MG tablet Take 2.5 mg by mouth Patient is taking 2.5 mg BID (5 mg daily)      metoprolol tartrate (LOPRESSOR) 50 MG tablet Take 1 tablet by mouth 2 times daily 60 tablet 0    Multiple Vitamins-Minerals (MULTI FOR HER 50+) TABS Take 1 tablet by mouth daily       No current facility-administered medications for this visit. Allergies  Allergies have been reviewed. Pelon Mauro is allergic to lorcet [hydrocodone-acetaminophen] and oxycodone-acetaminophen. Social History  Pelon Mauro  reports that she has never smoked. She has never used smokeless tobacco. She reports current alcohol use. She reports that she does not use drugs. Family History  Rosmery's family history includes Cancer in an other family member; Diabetes type 2  in her father; Heart Attack in her father and mother; Heart Disease in her father and mother. Review of Systems   History obtained from the patient. REVIEW OF SYSTEMS:   Constitution: negative for fever, chills, weight loss or malaise   Musculoskeletal: As noted in the HPI   Neurologic: As noted in the HPI    Physical Exam  Resp 14   Ht 5' 3\" (1.6 m)   Wt 121 lb (54.9 kg)   BMI 21.43 kg/m²    General Appearance: alert, well appearing, and in no distress  Mental Status: alert, oriented to person, place, and time  Evaluation of the patient's right hand/upper extremity demonstrates intact skin without warmth erythema or swelling. No intrinsic hand wasting appreciated. Sensation is altered to light touch in the median nerve distribution. She has slightly weakened  strength in the right hand compared to the left. She has a positive Tinel's at the carpal tunnel and positive Phalen's test.  Negative Tinel's at the cubital tunnel. Imaging Studies  3 x-ray views of the right wrist completed on 11/30/2022 were reviewed independently demonstrating some mild degeneration involving the radiocarpal joints as well as the first MCP and IP joints. No obvious fracture, dislocation or subluxation.     Assessment and Plan  Bertonelda Damon is a 81 y.o. old female with right hand paresthesias and weakness consistent with carpal tunnel syndrome. 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 I had a discussion with the patient today educating her about the condition of her hand and discussed treatment options available to her including nonoperative and operative intervention. She is attempted and failed attempts at management conservatively with a wrist brace. Consequently I believe surgery is reasonable for her i.e. an open carpal tunnel release. We discussed the details of the procedure, risks and benefits of surgery, expected outcome and postoperative recovery course. I would recommend electrodiagnostic testing before surgery. Risks as discussed included but were not limited to risk of infection, wound healing complications, stiffness, persistent pain and paresthesias, temporary and/or permanent nerve injury, and risk of anesthesia. She demonstrated a good understanding of our discussion and would like to think things over for making a decision with regards to the timing of her surgery. In the meantime we will get her referred for appropriate electrodiagnostic testing. She will call to notify us if end or when she would like to proceed with surgery and we can schedule her for surgery at that time at her convenience and facilitate her getting appropriate preoperative medical clearance. This note is created with the assistance of a speech recognition program.  While intending to generate adocument that actually reflects the content of the visit, the document can still have some errors including those of syntax and sound a like substitutions which may escape proof reading. It such instances, actual meaningcan be extrapolated by contextual diversion.

## 2022-12-09 RX ORDER — LEVOTHYROXINE SODIUM 0.15 MG/1
150 TABLET ORAL DAILY
Qty: 90 TABLET | Refills: 3 | Status: SHIPPED | OUTPATIENT
Start: 2022-12-09

## 2022-12-14 ENCOUNTER — OFFICE VISIT (OUTPATIENT)
Dept: PRIMARY CARE CLINIC | Age: 87
End: 2022-12-14
Payer: MEDICARE

## 2022-12-14 VITALS
DIASTOLIC BLOOD PRESSURE: 60 MMHG | HEIGHT: 63 IN | WEIGHT: 123 LBS | BODY MASS INDEX: 21.79 KG/M2 | SYSTOLIC BLOOD PRESSURE: 116 MMHG | HEART RATE: 60 BPM | OXYGEN SATURATION: 98 %

## 2022-12-14 DIAGNOSIS — J06.9 UPPER RESPIRATORY TRACT INFECTION, UNSPECIFIED TYPE: Primary | ICD-10-CM

## 2022-12-14 DIAGNOSIS — L21.9 SEBORRHEIC DERMATITIS: ICD-10-CM

## 2022-12-14 DIAGNOSIS — R21 RASH AND NONSPECIFIC SKIN ERUPTION: ICD-10-CM

## 2022-12-14 LAB
INFLUENZA A ANTIBODY: NEGATIVE
INFLUENZA B ANTIBODY: NEGATIVE

## 2022-12-14 PROCEDURE — 87804 INFLUENZA ASSAY W/OPTIC: CPT | Performed by: PHYSICIAN ASSISTANT

## 2022-12-14 PROCEDURE — 99213 OFFICE O/P EST LOW 20 MIN: CPT | Performed by: PHYSICIAN ASSISTANT

## 2022-12-14 PROCEDURE — 1123F ACP DISCUSS/DSCN MKR DOCD: CPT | Performed by: PHYSICIAN ASSISTANT

## 2022-12-14 RX ORDER — CICLOPIROX 1 G/100ML
1 SHAMPOO TOPICAL DAILY
Qty: 120 ML | Refills: 0 | Status: SHIPPED | OUTPATIENT
Start: 2022-12-14

## 2022-12-14 RX ORDER — AZITHROMYCIN 250 MG/1
250 TABLET, FILM COATED ORAL SEE ADMIN INSTRUCTIONS
Qty: 6 TABLET | Refills: 0 | Status: SHIPPED | OUTPATIENT
Start: 2022-12-14 | End: 2022-12-19

## 2022-12-14 RX ORDER — BENZONATATE 100 MG/1
100 CAPSULE ORAL 2 TIMES DAILY PRN
Qty: 20 CAPSULE | Refills: 0 | Status: SHIPPED | OUTPATIENT
Start: 2022-12-14 | End: 2022-12-21

## 2022-12-14 RX ORDER — PREDNISONE 10 MG/1
10 TABLET ORAL 2 TIMES DAILY
Qty: 10 TABLET | Refills: 0 | Status: SHIPPED | OUTPATIENT
Start: 2022-12-14 | End: 2022-12-19

## 2022-12-14 RX ORDER — CLOBETASOL PROPIONATE 0.05 G/100ML
1 SHAMPOO TOPICAL DAILY
Qty: 118 ML | Refills: 0 | Status: SHIPPED | OUTPATIENT
Start: 2022-12-14

## 2022-12-14 ASSESSMENT — ENCOUNTER SYMPTOMS
DIARRHEA: 1
COUGH: 1
CHEST TIGHTNESS: 0
SINUS PRESSURE: 0
SORE THROAT: 0
SHORTNESS OF BREATH: 0

## 2022-12-14 NOTE — PROGRESS NOTES
[address] [address]  Dept: 506 Jae Lee is a 81 y.o. female Established patient, who presents today for her medical conditions/complaints as noted below. Chief Complaint   Patient presents with    Pharyngitis     Started last Friday , Otc- none     Cough    Fatigue       HPI:     HPI: Friday started with sore throat. Saturday had sore throat nausea, and diarrhea. Tuesday started to feel better. Yesterday in the after noon started with cough and slight sore throat. Has been fatigued. No wheezing or SOB. No fevers. Slightly productive cough.      Reviewed prior notes None  Reviewed previous Labs    LDL Calculated (mg/dL)   Date Value   08/14/2021 80   10/25/2018 84   03/02/2017 96       (goal LDL is <100)   Hemoglobin A1C (%)   Date Value   10/27/2020 5.8     BP Readings from Last 3 Encounters:   12/14/22 116/60   11/11/22 132/76   08/15/22 (!) 142/70          (goal 120/80)  Hemoglobin A1C   Date Value Ref Range Status   10/27/2020 5.8 % Final     Past Medical History:   Diagnosis Date    Abnormal electrocardiography 10/29/2017    Acute coronary syndrome ([address] 10/29/2017    Ankle fracture, left 10/28/2016    Bilateral neck pain 12/28/2015    Costochondral chest pain 8/13/2021    Non-healing surgical wound 1/4/2017    Onychomycosis of toenail 7/27/2017    Open wound of ankle 12/21/2016      Past Surgical History:   Procedure Laterality Date    ANKLE FRACTURE SURGERY Left 10/18/2016    dr. Basil Mathias Bilateral 2014    KNEE SURGERY Right 2015 approx    Dr. Everett Camacho- replacement    SHOULDER ARTHROSCOPY Left     assenmacher    SHOULDER SURGERY Left 2015    assenmacher    THYROID SURGERY  1960 approx    goiter    TONSILLECTOMY  1956       Family History   Problem Relation Age of Onset    Heart Disease Mother     Heart Attack Mother     Heart Disease Father     Heart Attack Father     Diabetes type 2  Father     Cancer Other         multiple cancers on mom's side       Social History     Tobacco Use    Smoking status: Never    Smokeless tobacco: Never   Substance Use Topics    Alcohol use: Yes     Comment: ocassionally- few times a year      Current Outpatient Medications   Medication Sig Dispense Refill    azithromycin (ZITHROMAX) 250 MG tablet Take 1 tablet by mouth See Admin Instructions for 5 days 500mg on day 1 followed by 250mg on days 2 - 5 6 tablet 0    benzonatate (TESSALON) 100 MG capsule Take 1 capsule by mouth 2 times daily as needed for Cough 20 capsule 0    levothyroxine (SYNTHROID) 150 MCG tablet Take 1 tablet by mouth Daily 90 tablet 3    Probiotic Product (ALIGN PO) Take by mouth      polyethylene glycol (GLYCOLAX) 17 GM/SCOOP powder Follow Instructions provided by physician's office 238 g 0    bisacodyl (BISACODYL) 5 MG EC tablet Take 4 tablets in the morning 4 tablet 0    Omega-3 1000 MG CAPS Take by mouth      candesartan (ATACAND) 32 MG tablet Take 32 mg by mouth daily      amLODIPine (NORVASC) 2.5 MG tablet Take 2.5 mg by mouth Patient is taking 2.5 mg BID (5 mg daily)      metoprolol tartrate (LOPRESSOR) 50 MG tablet Take 1 tablet by mouth 2 times daily 60 tablet 0    Multiple Vitamins-Minerals (MULTI FOR HER 50+) TABS Take 1 tablet by mouth daily       No current facility-administered medications for this visit.      Allergies   Allergen Reactions    Lorcet [Hydrocodone-Acetaminophen] Other (See Comments)     Confusion    Oxycodone-Acetaminophen Nausea Only       Health Maintenance   Topic Date Due    COVID-19 Vaccine (4 - Booster for Pfizer series) 12/09/2021    Annual Wellness Visit (AWV)  12/14/2022    DTaP/Tdap/Td vaccine (1 - Tdap) 11/11/2023 (Originally 7/26/1954)    Flu vaccine (1) 11/11/2023 (Originally 8/1/2022)    Depression Screen  08/11/2023    Shingles vaccine  Completed    Pneumococcal 65+ years Vaccine  Completed    Hepatitis A vaccine  Aged Out Hib vaccine  Aged Out    Meningococcal (ACWY) vaccine  Aged Out       Subjective:      Review of Systems   Constitutional:  Negative for chills and fever. HENT:  Negative for congestion, sinus pressure and sore throat. Respiratory:  Positive for cough. Negative for chest tightness and shortness of breath. Cardiovascular:  Negative for chest pain, palpitations and leg swelling. Gastrointestinal:  Positive for diarrhea. Musculoskeletal:  Negative for gait problem and myalgias. Skin:  Negative for rash. Objective:     /60   Pulse 60   Ht 5' 3\" (1.6 m)   Wt 123 lb (55.8 kg)   SpO2 98%   BMI 21.79 kg/m²   Physical Exam  Constitutional:       General: She is not in acute distress. Appearance: Normal appearance. She is not ill-appearing. HENT:      Head: Normocephalic and atraumatic. Right Ear: External ear normal.      Left Ear: External ear normal.      Nose: Nose normal.      Mouth/Throat:      Mouth: Mucous membranes are moist.   Cardiovascular:      Rate and Rhythm: Normal rate and regular rhythm. Pulses: Normal pulses. Heart sounds: Normal heart sounds. Pulmonary:      Effort: Pulmonary effort is normal. No respiratory distress. Breath sounds: Normal breath sounds. Musculoskeletal:         General: Normal range of motion. Skin:     General: Skin is warm and dry. Neurological:      General: No focal deficit present. Mental Status: She is alert and oriented to person, place, and time. Psychiatric:         Mood and Affect: Mood normal.         Behavior: Behavior normal.         Thought Content: Thought content normal.       Assessment and Plan:          1. Upper respiratory tract infection, unspecified type  -     azithromycin (ZITHROMAX) 250 MG tablet; Take 1 tablet by mouth See Admin Instructions for 5 days 500mg on day 1 followed by 250mg on days 2 - 5, Disp-6 tablet, R-0Normal  -     benzonatate (TESSALON) 100 MG capsule;  Take 1 capsule by mouth 2 times daily as needed for Cough, Disp-20 capsule, R-0Normal  -     POCT Influenza A/B     Based on history sounds like patient had a viral upper respiratory infection which improved then became secondary bacterial infection. We will treat with antibiotics and patient should follow-up if symptoms do not improve. No follow-ups on file. Patient given educational materials - see patient instructions. Discussed use, benefit, and side effects of prescribed medications. All patient questions answered. Pt voiced understanding. Reviewed health maintenance. Instructed to continue current medications, diet and exercise. Patient agreed with treatment plan. Follow up as directed.      Electronically signed by FELIPE Freeman on 12/14/2022 at 2:37 PM

## 2022-12-20 ENCOUNTER — TELEPHONE (OUTPATIENT)
Dept: ORTHOPEDIC SURGERY | Age: 87
End: 2022-12-20

## 2022-12-20 NOTE — TELEPHONE ENCOUNTER
PANCHOM with patient stating that we have in fact received her EMG report and that they have been scanned into her chart. I requested that she call the office back to discuss what she would like to do going forward. At her LOV Dr. Naun Fernandez discussed sx but nothing was ever booked and it does not appear that a booking form was completed either at [address] [place] [address]. Office will await further direction from patient.

## 2022-12-20 NOTE — TELEPHONE ENCOUNTER
Notified pt that we have no received her EMG results yet. Advised that it can take up to a week for the report to be competed and sent back to our office.

## 2022-12-20 NOTE — TELEPHONE ENCOUNTER
Patient is calling to see if Dr Lilian Gonzales has received her EMG results from her test yesterday. Please return her call at earliest convenience. Thank you.

## 2022-12-21 NOTE — TELEPHONE ENCOUNTER
Dr. Evy Cohen,    Patient completed EMG (report has been scanned into her chart) and she is wanting to have CTR sx in April. Could you please fill out a sx booking form on her and give it to [facility] or I so that the patient can be scheduled for sx when she calls in Feb. Thanks.

## 2023-01-11 ENCOUNTER — OFFICE VISIT (OUTPATIENT)
Dept: PRIMARY CARE CLINIC | Age: 88
End: 2023-01-11
Payer: MEDICARE

## 2023-01-11 VITALS
WEIGHT: 121.8 LBS | HEIGHT: 63 IN | HEART RATE: 57 BPM | BODY MASS INDEX: 21.58 KG/M2 | DIASTOLIC BLOOD PRESSURE: 76 MMHG | SYSTOLIC BLOOD PRESSURE: 122 MMHG | OXYGEN SATURATION: 99 %

## 2023-01-11 DIAGNOSIS — I10 ESSENTIAL HYPERTENSION: ICD-10-CM

## 2023-01-11 DIAGNOSIS — Z00.00 MEDICARE ANNUAL WELLNESS VISIT, SUBSEQUENT: Primary | ICD-10-CM

## 2023-01-11 DIAGNOSIS — L21.9 SEBORRHEIC DERMATITIS: ICD-10-CM

## 2023-01-11 PROCEDURE — G0439 PPPS, SUBSEQ VISIT: HCPCS | Performed by: FAMILY MEDICINE

## 2023-01-11 PROCEDURE — 1123F ACP DISCUSS/DSCN MKR DOCD: CPT | Performed by: FAMILY MEDICINE

## 2023-01-11 RX ORDER — CLOBETASOL PROPIONATE 0.05 G/100ML
1 SHAMPOO TOPICAL DAILY
Qty: 118 ML | Refills: 2 | Status: SHIPPED | OUTPATIENT
Start: 2023-01-11

## 2023-01-11 RX ORDER — AMLODIPINE BESYLATE 2.5 MG/1
2.5 TABLET ORAL 2 TIMES DAILY
Qty: 60 TABLET | Refills: 3
Start: 2023-01-11

## 2023-01-11 ASSESSMENT — PATIENT HEALTH QUESTIONNAIRE - PHQ9
SUM OF ALL RESPONSES TO PHQ QUESTIONS 1-9: 0
SUM OF ALL RESPONSES TO PHQ QUESTIONS 1-9: 0
1. LITTLE INTEREST OR PLEASURE IN DOING THINGS: 0
2. FEELING DOWN, DEPRESSED OR HOPELESS: 0
SUM OF ALL RESPONSES TO PHQ QUESTIONS 1-9: 0
SUM OF ALL RESPONSES TO PHQ QUESTIONS 1-9: 0
SUM OF ALL RESPONSES TO PHQ9 QUESTIONS 1 & 2: 0

## 2023-01-11 ASSESSMENT — LIFESTYLE VARIABLES
HOW MANY STANDARD DRINKS CONTAINING ALCOHOL DO YOU HAVE ON A TYPICAL DAY: PATIENT DOES NOT DRINK
HOW OFTEN DO YOU HAVE A DRINK CONTAINING ALCOHOL: NEVER

## 2023-01-11 NOTE — PROGRESS NOTES
Medicare Annual Wellness Visit    [address] [facility] is here for Hypertension (Patient doesn't check B/P at home) and Medicare AWV (Patient was given the words respect elephant and green to remember. She was given the time of 4:15 to draw. )    Assessment & Plan   Medicare annual wellness visit, subsequent  Seborrheic dermatitis  -     Clobetasol Propionate 0.05 % SHAM; Apply 1 mL topically daily, Disp-118 mL, R-2Normal  Essential hypertension  Assessment & Plan:         Recommendations for Preventive Services Due: see orders and patient instructions/AVS.  Recommended screening schedule for the next 5-10 years is provided to the patient in written form: see Patient Instructions/AVS.     No follow-ups on file. Subjective   Patient notes that the medication has been working well with no issues. She notes that her jaw hurts and her dentist believes it is her wisdom teeth, so she scheduled an appointment with the dentist to remove them. Patient notes that her dermatologist prescribed some cream for her arms because of some \"pink welts. \"  The following acute and/or chronic problems were also addressed today:  As above    Patient's complete Health Risk Assessment and screening values have been reviewed and are found in Flowsheets. The following problems were reviewed today and where indicated follow up appointments were made and/or referrals ordered. Positive Risk Factor Screenings with Interventions:               General HRA Questions:  Select all that apply: (!) New or Increased Pain    Pain Interventions:  See above           Safety:  Do you have any tripping hazards - loose or unsecured carpets or rugs?: (!) Yes  Interventions:  See above                     Objective   Vitals:    01/11/23 1001   BP: 122/76   Pulse: 57   SpO2: 99%   Weight: 121 lb 12.8 oz (55.2 kg)   Height: 5' 3\" (1.6 m)      Body mass index is 21.58 kg/m². Physical Exam  Vitals and nursing note reviewed.    Constitutional:       General: She is not in acute distress. Appearance: She is well-developed. She is not ill-appearing. HENT:      Head: Normocephalic and atraumatic. Right Ear: External ear normal.      Left Ear: External ear normal.   Eyes:      General: No scleral icterus. Right eye: No discharge. Left eye: No discharge. Conjunctiva/sclera: Conjunctivae normal.   Neck:      Thyroid: No thyromegaly. Trachea: No tracheal deviation. Cardiovascular:      Rate and Rhythm: Normal rate and regular rhythm. Heart sounds: Normal heart sounds. Pulmonary:      Effort: Pulmonary effort is normal. No respiratory distress. Breath sounds: Normal breath sounds. No wheezing. Lymphadenopathy:      Cervical: No cervical adenopathy. Skin:     General: Skin is warm. Findings: No rash. Neurological:      Mental Status: She is alert and oriented to person, place, and time. Psychiatric:         Mood and Affect: Mood normal.         Behavior: Behavior normal.         Thought Content: Thought content normal.           Allergies   Allergen Reactions    Lorcet [Hydrocodone-Acetaminophen] Other (See Comments)     Confusion    Oxycodone-Acetaminophen Nausea Only     Prior to Visit Medications    Medication Sig Taking?  Authorizing Provider   Clobetasol Propionate 0.05 % SHAM Apply 1 mL topically daily Yes Vivek Wilson MD   Ciclopirox 1 % SHAM Apply 1 mL topically daily Yes FELIPE Vazquez   levothyroxine (SYNTHROID) 150 MCG tablet Take 1 tablet by mouth Daily Yes Vivek Wilson MD   Probiotic Product (ALIGN PO) Take by mouth Yes Historical Provider, MD   candesartan (ATACAND) 32 MG tablet Take 32 mg by mouth daily Yes Historical Provider, MD   amLODIPine (NORVASC) 2.5 MG tablet Take 2.5 mg by mouth Patient is taking 2.5 mg BID (5 mg daily) Yes Historical Provider, MD   metoprolol tartrate (LOPRESSOR) 50 MG tablet Take 1 tablet by mouth 2 times daily Yes Vivek Wilson MD   Multiple Vitamins-Minerals (MULTI FOR HER 50+) TABS Take 1 tablet by mouth daily Yes Historical Provider, MD   polyethylene glycol (GLYCOLAX) 17 GM/SCOOP powder Follow Instructions provided by physician's office  Patient not taking: Reported on 1/11/2023  Marsha Yang MD   bisacodyl (BISACODYL) 5 MG EC tablet Take 4 tablets in the morning  Patient not taking: Reported on 1/11/2023  Marsha Yang MD   Reva-3 1000 MG CAPS Take by mouth  Patient not taking: Reported on 1/11/2023  Historical Provider, MD Ar[place] (Including outside providers/suppliers regularly involved in providing care):   Patient Care Team:  Dolly Umana MD as PCP - General (Family Medicine)  Dolly Umana MD as PCP - [facility] Empaneled Provider     Reviewed and updated this visit:  Tobacco  Allergies  Meds  Med Hx  Surg Hx  Soc Hx  Fam Hx

## 2023-02-13 ENCOUNTER — TELEPHONE (OUTPATIENT)
Dept: ORTHOPEDIC SURGERY | Age: 88
End: 2023-02-13

## 2023-02-13 DIAGNOSIS — G56.01 CARPAL TUNNEL SYNDROME OF RIGHT WRIST: Primary | ICD-10-CM

## 2023-02-13 NOTE — TELEPHONE ENCOUNTER
Patient is requesting a return call to schedule Right carpal tunnel surgery with Dr Susana Shannon. Thank you.

## 2023-02-13 NOTE — TELEPHONE ENCOUNTER
Sx booking form completed by Dr. Maria Alejandra Bowser. Jania Us gave booking form to MM. CBC & BMP orders dropped. MM will contact patient to schedule sx.

## 2023-04-04 ENCOUNTER — NURSE ONLY (OUTPATIENT)
Dept: PRIMARY CARE CLINIC | Age: 88
End: 2023-04-04

## 2023-04-04 ENCOUNTER — HOSPITAL ENCOUNTER (OUTPATIENT)
Age: 88
Setting detail: SPECIMEN
Discharge: HOME | End: 2023-04-04

## 2023-04-04 DIAGNOSIS — U07.1 COVID-19: Primary | ICD-10-CM

## 2023-04-04 DIAGNOSIS — U07.1 COVID-19: ICD-10-CM

## 2023-04-04 NOTE — PROGRESS NOTES
Patient here today with ill  - recently traveled home from [place]. Patient presents with cough that started late last week. COVID swab preformed - patient tolerated well - instructed we will call with results once they are in.

## 2023-04-05 ENCOUNTER — OFFICE VISIT (OUTPATIENT)
Dept: PRIMARY CARE CLINIC | Age: 88
End: 2023-04-05
Payer: MEDICARE

## 2023-04-05 VITALS
BODY MASS INDEX: 22.07 KG/M2 | DIASTOLIC BLOOD PRESSURE: 60 MMHG | HEART RATE: 54 BPM | SYSTOLIC BLOOD PRESSURE: 132 MMHG | WEIGHT: 124.6 LBS | OXYGEN SATURATION: 98 %

## 2023-04-05 DIAGNOSIS — R05.1 ACUTE COUGH: Primary | ICD-10-CM

## 2023-04-05 LAB
SARS-COV-2 RNA RESP QL NAA+PROBE: NORMAL
SARS-COV-2 RNA RESP QL NAA+PROBE: NOT DETECTED
SOURCE: NORMAL

## 2023-04-05 PROCEDURE — 1123F ACP DISCUSS/DSCN MKR DOCD: CPT | Performed by: FAMILY MEDICINE

## 2023-04-05 PROCEDURE — 99213 OFFICE O/P EST LOW 20 MIN: CPT | Performed by: FAMILY MEDICINE

## 2023-04-05 RX ORDER — DEXTROMETHORPHAN POLISTIREX 30 MG/5ML
60 SUSPENSION ORAL 2 TIMES DAILY PRN
COMMUNITY
Start: 2023-04-05 | End: 2023-04-15

## 2023-04-05 RX ORDER — BENZONATATE 100 MG/1
100 CAPSULE ORAL 3 TIMES DAILY PRN
Qty: 30 CAPSULE | Refills: 0 | Status: SHIPPED | OUTPATIENT
Start: 2023-04-05 | End: 2023-04-05 | Stop reason: SDUPTHER

## 2023-04-05 RX ORDER — BENZONATATE 100 MG/1
100 CAPSULE ORAL 3 TIMES DAILY PRN
Qty: 30 CAPSULE | Refills: 0 | Status: SHIPPED | OUTPATIENT
Start: 2023-04-05 | End: 2023-04-15

## 2023-04-05 SDOH — ECONOMIC STABILITY: FOOD INSECURITY: WITHIN THE PAST 12 MONTHS, YOU WORRIED THAT YOUR FOOD WOULD RUN OUT BEFORE YOU GOT MONEY TO BUY MORE.: NEVER TRUE

## 2023-04-05 SDOH — ECONOMIC STABILITY: HOUSING INSECURITY
IN THE LAST 12 MONTHS, WAS THERE A TIME WHEN YOU DID NOT HAVE A STEADY PLACE TO SLEEP OR SLEPT IN A SHELTER (INCLUDING NOW)?: NO

## 2023-04-05 SDOH — ECONOMIC STABILITY: INCOME INSECURITY: HOW HARD IS IT FOR YOU TO PAY FOR THE VERY BASICS LIKE FOOD, HOUSING, MEDICAL CARE, AND HEATING?: NOT HARD AT ALL

## 2023-04-05 SDOH — ECONOMIC STABILITY: FOOD INSECURITY: WITHIN THE PAST 12 MONTHS, THE FOOD YOU BOUGHT JUST DIDN'T LAST AND YOU DIDN'T HAVE MONEY TO GET MORE.: NEVER TRUE

## 2023-04-05 ASSESSMENT — ENCOUNTER SYMPTOMS
SHORTNESS OF BREATH: 0
NAUSEA: 0
VOMITING: 0
DIARRHEA: 0
RHINORRHEA: 1
SORE THROAT: 0
COUGH: 1

## 2023-04-05 NOTE — PROGRESS NOTES
Assessment/Plan:   1. Acute cough       Return if symptoms worsen or fail to improve. Data Unavailable     No orders of the defined types were placed in this encounter. Orders Placed This Encounter   Medications    benzonatate (TESSALON PERLES) 100 MG capsule     Sig: Take 1 capsule by mouth 3 times daily as needed for Cough     Dispense:  30 capsule     Refill:  0    dextromethorphan (DELSYM) 30 MG/5ML extended release liquid     Sig: Take 10 mLs by mouth 2 times daily as needed for Cough       Patient given educational materials - see patient instructions. Discussed use, benefit, and side effects of prescribed medications. All patient questions answered. Pt voiced understanding. Reviewed health maintenance. Instructed to continue current medications, diet and exercise. Patient agreed with treatment plan. Follow up as directed.      Electronically signed by Carol Zee MD on 4/5/2023 at 2:02 PM

## 2023-04-17 ENCOUNTER — ANESTHESIA EVENT (OUTPATIENT)
Dept: OPERATING ROOM | Age: 88
End: 2023-04-17
Payer: MEDICARE

## 2023-04-18 ENCOUNTER — ANESTHESIA (OUTPATIENT)
Dept: OPERATING ROOM | Age: 88
End: 2023-04-18
Payer: MEDICARE

## 2023-04-18 ENCOUNTER — HOSPITAL ENCOUNTER (OUTPATIENT)
Age: 88
Setting detail: OUTPATIENT SURGERY
Discharge: HOME | End: 2023-04-18
Attending: ORTHOPAEDIC SURGERY | Admitting: ORTHOPAEDIC SURGERY
Payer: MEDICARE

## 2023-04-18 VITALS
HEIGHT: 63 IN | HEART RATE: 64 BPM | OXYGEN SATURATION: 97 % | WEIGHT: 121 LBS | BODY MASS INDEX: 21.44 KG/M2 | DIASTOLIC BLOOD PRESSURE: 80 MMHG | SYSTOLIC BLOOD PRESSURE: 156 MMHG | RESPIRATION RATE: 14 BRPM | TEMPERATURE: 97.6 F

## 2023-04-18 PROCEDURE — 2580000003 HC RX 258: Performed by: ANESTHESIOLOGY

## 2023-04-18 PROCEDURE — 3600000012 HC SURGERY LEVEL 2 ADDTL 15MIN: Performed by: ORTHOPAEDIC SURGERY

## 2023-04-18 PROCEDURE — 64721 CARPAL TUNNEL SURGERY: CPT | Performed by: ORTHOPAEDIC SURGERY

## 2023-04-18 PROCEDURE — 7100000000 HC PACU RECOVERY - FIRST 15 MIN: Performed by: ORTHOPAEDIC SURGERY

## 2023-04-18 PROCEDURE — 7100000010 HC PHASE II RECOVERY - FIRST 15 MIN: Performed by: ORTHOPAEDIC SURGERY

## 2023-04-18 PROCEDURE — 7100000001 HC PACU RECOVERY - ADDTL 15 MIN: Performed by: ORTHOPAEDIC SURGERY

## 2023-04-18 PROCEDURE — 7100000011 HC PHASE II RECOVERY - ADDTL 15 MIN: Performed by: ORTHOPAEDIC SURGERY

## 2023-04-18 PROCEDURE — 6370000000 HC RX 637 (ALT 250 FOR IP)

## 2023-04-18 PROCEDURE — 6360000002 HC RX W HCPCS: Performed by: ORTHOPAEDIC SURGERY

## 2023-04-18 PROCEDURE — 6360000002 HC RX W HCPCS: Performed by: ANESTHESIOLOGY

## 2023-04-18 PROCEDURE — 2500000003 HC RX 250 WO HCPCS: Performed by: ORTHOPAEDIC SURGERY

## 2023-04-18 PROCEDURE — 3700000001 HC ADD 15 MINUTES (ANESTHESIA): Performed by: ORTHOPAEDIC SURGERY

## 2023-04-18 PROCEDURE — 3600000002 HC SURGERY LEVEL 2 BASE: Performed by: ORTHOPAEDIC SURGERY

## 2023-04-18 PROCEDURE — 2580000003 HC RX 258: Performed by: ORTHOPAEDIC SURGERY

## 2023-04-18 PROCEDURE — 3700000000 HC ANESTHESIA ATTENDED CARE: Performed by: ORTHOPAEDIC SURGERY

## 2023-04-18 PROCEDURE — 2709999900 HC NON-CHARGEABLE SUPPLY: Performed by: ORTHOPAEDIC SURGERY

## 2023-04-18 PROCEDURE — 2500000003 HC RX 250 WO HCPCS: Performed by: ANESTHESIOLOGY

## 2023-04-18 RX ORDER — SODIUM CHLORIDE 9 MG/ML
INJECTION, SOLUTION INTRAVENOUS PRN
Status: DISCONTINUED | OUTPATIENT
Start: 2023-04-18 | End: 2023-04-18 | Stop reason: HOSPADM

## 2023-04-18 RX ORDER — GLYCOPYRROLATE 0.2 MG/ML
INJECTION INTRAMUSCULAR; INTRAVENOUS PRN
Status: DISCONTINUED | OUTPATIENT
Start: 2023-04-18 | End: 2023-04-18 | Stop reason: SDUPTHER

## 2023-04-18 RX ORDER — BUPIVACAINE HYDROCHLORIDE 5 MG/ML
INJECTION, SOLUTION PERINEURAL PRN
Status: DISCONTINUED | OUTPATIENT
Start: 2023-04-18 | End: 2023-04-18 | Stop reason: ALTCHOICE

## 2023-04-18 RX ORDER — SODIUM CHLORIDE 0.9 % (FLUSH) 0.9 %
5-40 SYRINGE (ML) INJECTION EVERY 12 HOURS SCHEDULED
Status: DISCONTINUED | OUTPATIENT
Start: 2023-04-18 | End: 2023-04-18 | Stop reason: HOSPADM

## 2023-04-18 RX ORDER — MIDAZOLAM HYDROCHLORIDE 2 MG/2ML
2 INJECTION, SOLUTION INTRAMUSCULAR; INTRAVENOUS
Status: DISCONTINUED | OUTPATIENT
Start: 2023-04-18 | End: 2023-04-18 | Stop reason: HOSPADM

## 2023-04-18 RX ORDER — BUPIVACAINE HYDROCHLORIDE 5 MG/ML
INJECTION, SOLUTION EPIDURAL; INTRACAUDAL
Status: DISCONTINUED
Start: 2023-04-18 | End: 2023-04-18 | Stop reason: HOSPADM

## 2023-04-18 RX ORDER — DEXAMETHASONE SODIUM PHOSPHATE 10 MG/ML
10 INJECTION, SOLUTION INTRAMUSCULAR; INTRAVENOUS ONCE
Status: COMPLETED | OUTPATIENT
Start: 2023-04-18 | End: 2023-04-18

## 2023-04-18 RX ORDER — MEPERIDINE HYDROCHLORIDE 50 MG/ML
12.5 INJECTION INTRAMUSCULAR; INTRAVENOUS; SUBCUTANEOUS EVERY 5 MIN PRN
Status: DISCONTINUED | OUTPATIENT
Start: 2023-04-18 | End: 2023-04-18 | Stop reason: HOSPADM

## 2023-04-18 RX ORDER — ACETAMINOPHEN 500 MG
TABLET ORAL
Status: COMPLETED
Start: 2023-04-18 | End: 2023-04-18

## 2023-04-18 RX ORDER — HYDRALAZINE HYDROCHLORIDE 20 MG/ML
10 INJECTION INTRAMUSCULAR; INTRAVENOUS
Status: DISCONTINUED | OUTPATIENT
Start: 2023-04-18 | End: 2023-04-18 | Stop reason: HOSPADM

## 2023-04-18 RX ORDER — ACETAMINOPHEN 500 MG
1000 TABLET ORAL ONCE
Status: COMPLETED | OUTPATIENT
Start: 2023-04-18 | End: 2023-04-18

## 2023-04-18 RX ORDER — DEXAMETHASONE SODIUM PHOSPHATE 10 MG/ML
INJECTION, SOLUTION INTRAMUSCULAR; INTRAVENOUS
Status: COMPLETED
Start: 2023-04-18 | End: 2023-04-18

## 2023-04-18 RX ORDER — EPHEDRINE SULFATE/0.9% NACL/PF 50 MG/5 ML
SYRINGE (ML) INTRAVENOUS PRN
Status: DISCONTINUED | OUTPATIENT
Start: 2023-04-18 | End: 2023-04-18 | Stop reason: SDUPTHER

## 2023-04-18 RX ORDER — DIPHENHYDRAMINE HYDROCHLORIDE 50 MG/ML
12.5 INJECTION INTRAMUSCULAR; INTRAVENOUS
Status: DISCONTINUED | OUTPATIENT
Start: 2023-04-18 | End: 2023-04-18 | Stop reason: HOSPADM

## 2023-04-18 RX ORDER — LABETALOL HYDROCHLORIDE 5 MG/ML
10 INJECTION, SOLUTION INTRAVENOUS
Status: DISCONTINUED | OUTPATIENT
Start: 2023-04-18 | End: 2023-04-18 | Stop reason: HOSPADM

## 2023-04-18 RX ORDER — GLYCOPYRROLATE 0.2 MG/ML
0.4 INJECTION INTRAMUSCULAR; INTRAVENOUS ONCE
Status: DISCONTINUED | OUTPATIENT
Start: 2023-04-18 | End: 2023-04-18 | Stop reason: HOSPADM

## 2023-04-18 RX ORDER — SODIUM CHLORIDE 0.9 % (FLUSH) 0.9 %
5-40 SYRINGE (ML) INJECTION PRN
Status: DISCONTINUED | OUTPATIENT
Start: 2023-04-18 | End: 2023-04-18 | Stop reason: HOSPADM

## 2023-04-18 RX ORDER — CEFAZOLIN 2 G/1
INJECTION, POWDER, FOR SOLUTION INTRAMUSCULAR; INTRAVENOUS
Status: DISCONTINUED
Start: 2023-04-18 | End: 2023-04-18 | Stop reason: HOSPADM

## 2023-04-18 RX ORDER — SODIUM CHLORIDE, SODIUM LACTATE, POTASSIUM CHLORIDE, CALCIUM CHLORIDE 600; 310; 30; 20 MG/100ML; MG/100ML; MG/100ML; MG/100ML
INJECTION, SOLUTION INTRAVENOUS CONTINUOUS
Status: DISCONTINUED | OUTPATIENT
Start: 2023-04-18 | End: 2023-04-18 | Stop reason: HOSPADM

## 2023-04-18 RX ORDER — LIDOCAINE HYDROCHLORIDE 10 MG/ML
INJECTION, SOLUTION INFILTRATION; PERINEURAL PRN
Status: DISCONTINUED | OUTPATIENT
Start: 2023-04-18 | End: 2023-04-18 | Stop reason: SDUPTHER

## 2023-04-18 RX ORDER — PROMETHAZINE HYDROCHLORIDE 25 MG/ML
6.25 INJECTION, SOLUTION INTRAMUSCULAR; INTRAVENOUS EVERY 5 MIN PRN
Status: DISCONTINUED | OUTPATIENT
Start: 2023-04-18 | End: 2023-04-18 | Stop reason: HOSPADM

## 2023-04-18 RX ORDER — SODIUM CHLORIDE 9 MG/ML
25 INJECTION, SOLUTION INTRAVENOUS PRN
Status: DISCONTINUED | OUTPATIENT
Start: 2023-04-18 | End: 2023-04-18 | Stop reason: HOSPADM

## 2023-04-18 RX ORDER — METOCLOPRAMIDE HYDROCHLORIDE 5 MG/ML
10 INJECTION INTRAMUSCULAR; INTRAVENOUS
Status: DISCONTINUED | OUTPATIENT
Start: 2023-04-18 | End: 2023-04-18 | Stop reason: HOSPADM

## 2023-04-18 RX ORDER — ONDANSETRON 2 MG/ML
INJECTION INTRAMUSCULAR; INTRAVENOUS PRN
Status: DISCONTINUED | OUTPATIENT
Start: 2023-04-18 | End: 2023-04-18 | Stop reason: SDUPTHER

## 2023-04-18 RX ORDER — IPRATROPIUM BROMIDE AND ALBUTEROL SULFATE 2.5; .5 MG/3ML; MG/3ML
1 SOLUTION RESPIRATORY (INHALATION)
Status: DISCONTINUED | OUTPATIENT
Start: 2023-04-18 | End: 2023-04-18 | Stop reason: HOSPADM

## 2023-04-18 RX ORDER — ONDANSETRON 2 MG/ML
4 INJECTION INTRAMUSCULAR; INTRAVENOUS
Status: DISCONTINUED | OUTPATIENT
Start: 2023-04-18 | End: 2023-04-18 | Stop reason: HOSPADM

## 2023-04-18 RX ORDER — FENTANYL CITRATE 50 UG/ML
INJECTION, SOLUTION INTRAMUSCULAR; INTRAVENOUS PRN
Status: DISCONTINUED | OUTPATIENT
Start: 2023-04-18 | End: 2023-04-18 | Stop reason: SDUPTHER

## 2023-04-18 RX ORDER — PROPOFOL 10 MG/ML
INJECTION, EMULSION INTRAVENOUS PRN
Status: DISCONTINUED | OUTPATIENT
Start: 2023-04-18 | End: 2023-04-18 | Stop reason: SDUPTHER

## 2023-04-18 RX ORDER — MORPHINE SULFATE 2 MG/ML
2 INJECTION, SOLUTION INTRAMUSCULAR; INTRAVENOUS EVERY 5 MIN PRN
Status: DISCONTINUED | OUTPATIENT
Start: 2023-04-18 | End: 2023-04-18 | Stop reason: HOSPADM

## 2023-04-18 RX ADMIN — PROPOFOL 100 MG: 10 INJECTION, EMULSION INTRAVENOUS at 09:28

## 2023-04-18 RX ADMIN — ONDANSETRON 4 MG: 2 INJECTION INTRAMUSCULAR; INTRAVENOUS at 09:40

## 2023-04-18 RX ADMIN — LIDOCAINE HYDROCHLORIDE 20 MG: 10 INJECTION, SOLUTION INFILTRATION; PERINEURAL at 09:28

## 2023-04-18 RX ADMIN — Medication 1000 MG: at 08:15

## 2023-04-18 RX ADMIN — CEFAZOLIN 2000 MG: 2 INJECTION, POWDER, FOR SOLUTION INTRAMUSCULAR; INTRAVENOUS at 09:32

## 2023-04-18 RX ADMIN — SODIUM CHLORIDE, POTASSIUM CHLORIDE, SODIUM LACTATE AND CALCIUM CHLORIDE: 600; 310; 30; 20 INJECTION, SOLUTION INTRAVENOUS at 08:50

## 2023-04-18 RX ADMIN — FENTANYL CITRATE 50 MCG: 50 INJECTION, SOLUTION INTRAMUSCULAR; INTRAVENOUS at 09:55

## 2023-04-18 RX ADMIN — PROPOFOL 50 MG: 10 INJECTION, EMULSION INTRAVENOUS at 09:31

## 2023-04-18 RX ADMIN — GLYCOPYRROLATE 0.2 MG: 0.2 INJECTION INTRAMUSCULAR; INTRAVENOUS at 09:34

## 2023-04-18 RX ADMIN — DEXAMETHASONE SODIUM PHOSPHATE 8 MG: 10 INJECTION, SOLUTION INTRAMUSCULAR; INTRAVENOUS at 09:37

## 2023-04-18 RX ADMIN — FENTANYL CITRATE 50 MCG: 50 INJECTION, SOLUTION INTRAMUSCULAR; INTRAVENOUS at 09:28

## 2023-04-18 RX ADMIN — Medication 10 MG: at 09:36

## 2023-04-18 RX ADMIN — ACETAMINOPHEN 1000 MG: 500 TABLET ORAL at 08:15

## 2023-04-18 ASSESSMENT — PAIN - FUNCTIONAL ASSESSMENT: PAIN_FUNCTIONAL_ASSESSMENT: NONE - DENIES PAIN

## 2023-04-18 NOTE — ANESTHESIA POSTPROCEDURE EVALUATION
POST- ANESTHESIA EVALUATION       Pt Name: Angel Franklin  MRN: [id number]  YOB: 1935  Date of evaluation: 4/18/2023  Time:  11:09 AM      BP (!) 157/76   Pulse 61   Temp 97.6 °F (36.4 °C)   Resp 15   Ht 5' 3\" (1.6 m)   Wt 121 lb (54.9 kg)   SpO2 98%   BMI 21.43 kg/m²      Consciousness Level  Awake  Cardiopulmonary Status  Stable  Pain Adequately Treated YES  Nausea / Vomiting  NO  Adequate Hydration  YES  Anesthesia Related Complications NONE      Electronically signed by Scar Self MD on 4/18/2023 at 11:09 AM       Department of Anesthesiology  Postprocedure Note    Patient: Ramiro Damon  MRN: [id number]  YOB: 1935  Date of evaluation: 4/18/2023      Procedure Summary     Date: 04/18/23 Room / Location: [address]    Anesthesia Start: 2625 Anesthesia Stop: 1007    Procedure: RIGHT CARPAL TUNNEL RELEASE (Right: Hand) Diagnosis:       Carpal tunnel syndrome of right wrist      (Carpal tunnel syndrome of right wrist [G56.01])    Surgeons: Marissa Mcgraw MD Responsible Provider: Scar Self MD    Anesthesia Type: general ASA Status: 2          Anesthesia Type: No value filed.     Aurelio Phase I: Aurelio Score: 10    Aurelio Phase II:        Anesthesia Post Evaluation

## 2023-04-18 NOTE — H&P
Update History & Physical    The patient's History and Physical of April 14, 2023 was reviewed with the patient and I examined the patient. There was no change. The surgical site was confirmed by the patient and me. Plan: The risks, benefits, expected outcome, and alternative to the recommended procedure have been discussed with the patient. Patient understands and wants to proceed with the procedure.      Electronically signed by Cesia Mishra MD on 4/18/2023 at 9:26 AM

## 2023-04-18 NOTE — ANESTHESIA PRE PROCEDURE
Arthritis     Bilateral neck pain 12/28/2015    Costochondral chest pain 08/13/2021    Hypertension     Non-healing surgical wound 01/04/2017    Onychomycosis of toenail 07/27/2017    Open wound of ankle 12/21/2016    Thyroid disease        Past Surgical History:        Procedure Laterality Date    ANKLE FRACTURE SURGERY Left 10/18/2016    dr. Courtney Puga Bilateral 2014    KNEE SURGERY Right 2015 approx    Dr. Kerns Pancoast- replacement    SHOULDER ARTHROSCOPY Left     assenmacher    SHOULDER SURGERY Left 2015    assenmacher    THYROID [address] approx    goiter    TONSILLECTOMY  1956       Social History:    Social History     Tobacco Use    Smoking status: Never    Smokeless tobacco: Never   Substance Use Topics    Alcohol use: Not Currently     Comment: ocassionally- few times a year                                Counseling given: Not Answered      Vital Signs (Current):   Vitals:    04/18/23 0750   BP: (!) 152/75   Pulse: 54   Resp: 10   Temp: 97.2 °F (36.2 °C)   TempSrc: Infrared   SpO2: 98%   Weight: 121 lb (54.9 kg)   Height: 5' 3\" (1.6 m)                                              BP Readings from Last 3 Encounters:   04/18/23 (!) 152/75   04/05/23 132/60   01/11/23 122/76       NPO Status: Time of last liquid consumption: 0630 (sip with med)                        Time of last solid consumption: 1800                        Date of last liquid consumption: 04/18/23                        Date of last solid food consumption: 04/17/23    BMI:   Wt Readings from Last 3 Encounters:   04/18/23 121 lb (54.9 kg)   04/14/23 124 lb (56.2 kg)   04/05/23 124 lb 9.6 oz (56.5 kg)     Body mass index is 21.43 kg/m².     CBC:   Lab Results   Component Value Date/Time    WBC 6.9 04/12/2023 01:33 PM    RBC 4.53 04/12/2023 01:33 PM    HGB 14.2 04/12/2023 01:33 PM    HCT 41.9 04/12/2023 01:33 PM    MCV 92.5 04/12/2023 01:33 PM    RDW 12.8 04/12/2023 01:33 PM

## 2023-04-18 NOTE — BRIEF OP NOTE
Brief Postoperative Note      Patient:  Rosmery Damon  YOB: 1935  MRN: [id number]    Date of Procedure: 4/18/2023    Pre-Op Diagnosis Codes:     * Carpal tunnel syndrome of right wrist [G56.01]    Post-Op Diagnosis: Same       Procedure(s):  RIGHT CARPAL TUNNEL RELEASE    Surgeon(s):  Jaimie Zarate MD    Assistant:  Resident: Jessica Amezquita DO    Anesthesia: General    Estimated Blood Loss (mL): Minimal    Complications: None    Specimens:   * No specimens in log *    Implants:  * No implants in log *      Drains: * No LDAs found *    Findings: See operative report      Electronically signed by Jaimie Zarate MD on 4/18/2023 at 11:03 AM

## 2023-04-18 NOTE — DISCHARGE INSTRUCTIONS
amount of pain medication required, and increase the time intervals between narcotic pain medication use. Do not take more than 2400mg in a day. Please resume all home medications, unless instructed otherwise. ACTIVITY  Keep the limb elevated for several days following surgery to decrease swelling. Start moving your fingers right away, this will help decrease swelling. Do not engage in activities which increase pain/swelling such as lifting, pushing or pulling for at least 6 weeks following surgery. NO driving while taking narcotic medications or until instructed otherwise by physician. May return to work or school 3-4 days after surgery, if pain is tolerable. ICE THERAPY  Begin immediately after surgery. Do not place ice directly on the skin (place over an Ace wrap or thin clothing). Use ice packs every 1-2 hours for 20 minutes each time daily for the first week and then as needed after that for pain and swelling. EXERCISE  You may start moving your fingers and wrist gently as you feel comfortable    EMERGENCIES  Contact Dr. Osiel Babcock or his nurse at [phone] if any of the following are present: Painful swelling or numbness, Unrelenting pain, Fever (over 101 - it is normal to have a low grade fever for the first day or two following surgery) or chills, Redness around incisions, Color change in hand or fingers, Continuous drainage or bleeding from incision (a small amount of drainage is expected), Difficulty breathing,excessive nausea/vomitin  **If you have an emergency after office hours or on the weekend, contact the same office number ([phone]) and you will be connected to our page service - they will contact Dr. Osiel Babcock or his partner if he is unavailable. Do NOT call the hospital or surgicenter. **If you have an emergency that requires immediate attention, proceed to the nearest emergency room.     FOLLOW-UP CARE / QUESTIONS  If you have any questions/concerns, please call the office

## 2023-04-18 NOTE — OP NOTE
mmHg.  A longitudinal incision ulnar to her thenar crease was made through skin and blunt dissection was carried down to the level of the palmar fascia which was incised sharply to expose the transverse carpal ligament which in turn was incised in longitudinal fashion in line with the skin incision first to its distal extent stopping once the fat pad was reached indicating the distal extent of the tunnel. The proximal extent of the transverse carpal ligament and distal forearm fascia was bluntly dissected and under direct visualization released along its length taking care to avoid injury to any traversing nerve branches. The carpal tunnel was bluntly probed to ensure complete release of the transverse carpal ligament and distal forearm fascia. Satisfied with the release the patient's incision was thoroughly irrigated and incision site infiltrated with approximately 5 cc of 0.5% Marcaine without epi. The patient's incision was then closed using 3-0 nylon sutures. Sterile dressings were then applied using Xeroform, 4 x 4 gauze and Tegaderm. A compressive Ace wrap was also applied. The tourniquet was deflated after total time of 12 minutes. The patient was awoken transferred to her bed and wheeled to recovery in stable condition.      Complications: None    Post-operative Condition: Stable

## 2023-05-03 ENCOUNTER — OFFICE VISIT (OUTPATIENT)
Dept: ORTHOPEDIC SURGERY | Age: 88
End: 2023-05-03

## 2023-05-03 VITALS — WEIGHT: 121 LBS | BODY MASS INDEX: 21.44 KG/M2 | RESPIRATION RATE: 14 BRPM | HEIGHT: 63 IN

## 2023-05-03 DIAGNOSIS — Z98.890 STATUS POST CARPAL TUNNEL RELEASE: Primary | ICD-10-CM

## 2023-05-03 PROCEDURE — 99024 POSTOP FOLLOW-UP VISIT: CPT | Performed by: ORTHOPAEDIC SURGERY

## 2023-05-03 NOTE — PROGRESS NOTES
Procedure: Right carpal tunnel release  Date of procedure: 4/18/2023    HPI: Ms. Khalif Floyd is an 81year-old who is approximately 2 weeks status post the aforementioned procedure. She indicates that she is doing relatively well at this time. Her pain has been appropriately controlled. She reports having some persistent numbness and tingling. She states that it remains pretty much unchanged from prior to surgery. Physical examination:  Evaluation of patient's right hand and upper extremity demonstrates her incision to be clean, dry, intact without wound dehiscence or drainage. Sensation is grossly intact light touch in all dermatomes and she has a 2+ radial pulse with brisk cap refill in her fingers. She can actively flex, extend, abduct and adduct all of her fingers. Impression and plan: Ms. Khalif Floyd is an 81year-old approximately 2 weeks status post a right open carpal tunnel release. She is doing relatively well at this time. Today her sutures were taken out and Steri-Strips and clean dressings were applied. She may now get her incision wet in the shower but is to avoid submerging it in a bath or any body of water. She may continue to use this arm for light activities of daily living limiting any lifting pushing or pulling to 1 pound. We had a discussion about the persistent paresthesias that she is experiencing. I explained that it can take a while for the nerve to recover after decompression and so we will continue to monitor hoping for improvement and resolution given time. I will see her back for reevaluation in 4 weeks but she may return or call at any time persistent or worsening symptoms or with any questions or concerns.

## 2023-05-31 ENCOUNTER — OFFICE VISIT (OUTPATIENT)
Dept: ORTHOPEDIC SURGERY | Age: 88
End: 2023-05-31

## 2023-05-31 VITALS — WEIGHT: 121 LBS | RESPIRATION RATE: 14 BRPM | BODY MASS INDEX: 21.44 KG/M2 | HEIGHT: 63 IN

## 2023-05-31 DIAGNOSIS — Z98.890 STATUS POST CARPAL TUNNEL RELEASE: Primary | ICD-10-CM

## 2023-05-31 PROCEDURE — 99024 POSTOP FOLLOW-UP VISIT: CPT | Performed by: ORTHOPAEDIC SURGERY

## 2023-05-31 NOTE — PROGRESS NOTES
Procedure: Right carpal tunnel release  Date of procedure: 4/18/2023    HPI: Ms. Pate Has is an 81year-old who is approximately 6 weeks status post the aforementioned procedure. She states today that she continues to have persistent numbness and tingling unchanged since our last visit. She denies having any pain. She has kept up diligently with her home exercise program.    Physical examination:  Evaluation of patient's right hand and upper extremity demonstrates her incision to be appropriately healed. She has altered sensation to light touch compared to the contralateral side in the median nerve distribution. She can actively flex, extend, abduct and adduct all of her fingers. Impression and plan: Ms. Pate Has is an 81year-old approximately 6 weeks status post a right open carpal tunnel release. She has persistent paresthesias involving the median nerve distribution of the hand. I did have a discussion with the patient today once again about this. She does not appreciate any improvement compared to your preoperative state. I explained that I would have anticipated some improvement at this point. Consequently my suspicion is that this will remain unchanged. However she was encouraged to continue her exercises and gradually increase use of the hand as she feels comfortable and I will see her back for reevaluation in 6 weeks but she may return or call earlier with questions and or concerns. Pt here ambulatory for scheduled injection

## 2023-07-26 ENCOUNTER — OFFICE VISIT (OUTPATIENT)
Dept: ORTHOPEDIC SURGERY | Age: 88
End: 2023-07-26

## 2023-07-26 VITALS — WEIGHT: 121 LBS | RESPIRATION RATE: 14 BRPM | HEIGHT: 63 IN | BODY MASS INDEX: 21.44 KG/M2

## 2023-07-26 DIAGNOSIS — Z98.890 STATUS POST CARPAL TUNNEL RELEASE: Primary | ICD-10-CM

## 2023-07-26 PROCEDURE — 99024 POSTOP FOLLOW-UP VISIT: CPT | Performed by: ORTHOPAEDIC SURGERY

## 2023-07-26 NOTE — PROGRESS NOTES
Procedure: Right carpal tunnel release  Date of procedure: 4/18/2023    HPI: Ms. Brenda Rizo is an 81year-old who is approximately 3 months status post the aforementioned procedure. She states that she is doing relatively well but still has persistent numbness involving the tips of her index, middle and ring fingers as well as her thumb. She has no pain. Physical examination:  Evaluation of patient's right hand and upper extremity demonstrates her incision to be appropriately healed. She has altered sensation to light touch compared to the contralateral side in the median nerve distribution. She can actively flex, extend, abduct and adduct all of her fingers. She is nontender to palpation diffusely. Impression and plan: Ms. Brenda Rizo is an 81year-old approximately 3 months status post a right open carpal tunnel release. She has persistent paresthesias involving the median nerve distribution of the hand with minimal improvement postoperatively. I once again had a discussion with the patient today about this. I believe a complete release was performed intraoperatively. At this time I recommended that she continue on with her daily activity as she feels comfortable. There is a chance that her symptoms will resolve however I believe and explained that this is likely slim. I will have her follow-up in my clinic as needed but she was encouraged to return or call at anytime with questions or concerns.

## 2023-10-02 ENCOUNTER — OFFICE VISIT (OUTPATIENT)
Dept: PRIMARY CARE CLINIC | Age: 88
End: 2023-10-02
Payer: MEDICARE

## 2023-10-02 VITALS
HEART RATE: 67 BPM | HEIGHT: 63 IN | BODY MASS INDEX: 21.93 KG/M2 | OXYGEN SATURATION: 97 % | DIASTOLIC BLOOD PRESSURE: 84 MMHG | WEIGHT: 123.8 LBS | SYSTOLIC BLOOD PRESSURE: 134 MMHG

## 2023-10-02 DIAGNOSIS — E03.9 HYPOTHYROIDISM, UNSPECIFIED TYPE: ICD-10-CM

## 2023-10-02 DIAGNOSIS — I10 ESSENTIAL HYPERTENSION: Primary | ICD-10-CM

## 2023-10-02 DIAGNOSIS — I87.2 EDEMA OF BOTH LOWER EXTREMITIES DUE TO PERIPHERAL VENOUS INSUFFICIENCY: ICD-10-CM

## 2023-10-02 DIAGNOSIS — G57.01 PIRIFORMIS SYNDROME, RIGHT: ICD-10-CM

## 2023-10-02 DIAGNOSIS — R06.09 DYSPNEA ON EXERTION: ICD-10-CM

## 2023-10-02 PROCEDURE — 99214 OFFICE O/P EST MOD 30 MIN: CPT | Performed by: FAMILY MEDICINE

## 2023-10-02 PROCEDURE — 1123F ACP DISCUSS/DSCN MKR DOCD: CPT | Performed by: FAMILY MEDICINE

## 2023-10-02 ASSESSMENT — ENCOUNTER SYMPTOMS
DIARRHEA: 0
RHINORRHEA: 0
WHEEZING: 0
SORE THROAT: 0
VOMITING: 0
EYE ITCHING: 0
COUGH: 0
CHEST TIGHTNESS: 0
NAUSEA: 0
EYE REDNESS: 0
SHORTNESS OF BREATH: 1
ABDOMINAL PAIN: 0
CONSTIPATION: 0

## 2023-10-02 NOTE — PROGRESS NOTES
[id number] PR[facility]ARE  [address] [address]  Dept: [id number] Whitney Colvin is a 81 y.o. female Established patient, who presents today for her medical conditions/complaints as noted below. Chief Complaint   Patient presents with    Hypertension     Medication check. Pt checks bp at home and states its been normal.    Flu Vaccine     Pt declined       HPI:   History of hypertension. Patient has been monitoring blood pressure at home. Blood pressure runs 120s-130s on average systolic. Swelling of ankles. States around noon to mid afternoon, her ankles swell so much that they ache. Keeping feet elevated decreases the swelling. She is very active and does not sit much. Has been getting a funny feeling in her chest and feeling winded. She states she is short of breath with exertion. She is unable to describe the feeling. Has pain on right buttock that has been bothering her. States that it radiated down the back of her right thigh one time but typically does not radiate. She states she stretches at home which helps some. Bending over and touching toes makes the pain worse. Declining flu vaccine today.   Reviewed prior notes: None   Reviewed previous:   None    LDL Calculated (mg/dL)   Date Value   08/14/2021 80   10/25/2018 84   03/02/2017 96       (goal LDL is <100)   BUN (mg/dL)   Date Value   04/12/2023 18     Hemoglobin A1C (%)   Date Value   10/27/2020 5.8     BP Readings from Last 3 Encounters:   10/02/23 134/84   04/18/23 (!) 156/80   04/05/23 132/60          (goal 120/80)    Past Medical History:   Diagnosis Date    Abnormal electrocardiography 10/29/2017    Acute coronary syndrome ([address]) 10/29/2017    Ankle fracture, left 10/28/2016    Arthritis     Bilateral neck pain 12/28/2015    Costochondral chest pain 08/13/2021    Hypertension     Non-healing surgical wound 01/04/2017    Onychomycosis of toenail 07/27/2017    Open wound of ankle

## 2023-10-04 ENCOUNTER — TELEPHONE (OUTPATIENT)
Dept: PRIMARY CARE CLINIC | Age: 88
End: 2023-10-04

## 2023-10-04 NOTE — TELEPHONE ENCOUNTER
Please call scheduling dept and find out why they are telling the pt this. .. Ellis Rivas She does not need a cardiologist to order an echo.   I am wondering if it is because the new order in the computer says contrast and bubble study prn.?

## 2023-10-04 NOTE — TELEPHONE ENCOUNTER
----- Message from Evert Deleon sent at 10/3/2023  9:42 AM EDT -----  Subject: Message to Provider    QUESTIONS  Information for Provider? Pt called [facility] to schedule her ECHO per   referral # [id number]. They told pt that she had to schedule this through a   cardiologist & that it had to be completed through [facility]. Pt is   confused and would like a call back to clarify if she needs to do this   through a cardiologist or not.   ---------------------------------------------------------------------------  --------------  Liza [place] Carlie  [phone]; OK to leave message on voicemail  ---------------------------------------------------------------------------  --------------  SCRIPT ANSWERS  Relationship to Patient?  Self

## 2023-10-05 NOTE — TELEPHONE ENCOUNTER
[facility] scheduling calls back and they can schedule this test with the patient. She thinks patient may have said (or they understood her to say) CALVIN. They will call patient to schedule her per Jose A Niño.

## 2023-10-05 NOTE — TELEPHONE ENCOUNTER
[facility] SPECIAL[facility] scheduling . Was on hold for 10 minutes, pushed 1 to have them call us back. Put in chart who this call is regarding.

## 2023-10-06 ENCOUNTER — TELEPHONE (OUTPATIENT)
Dept: PRIMARY CARE CLINIC | Age: 88
End: 2023-10-06

## 2023-10-06 DIAGNOSIS — R06.09 DYSPNEA ON EXERTION: Primary | ICD-10-CM

## 2023-10-06 DIAGNOSIS — I10 ESSENTIAL HYPERTENSION: ICD-10-CM

## 2023-10-06 NOTE — TELEPHONE ENCOUNTER
Looks like this is the issue with [facility] scheduling these- just FYI- this pt already scheduled at [facility] so no need to do anything else with this.

## 2023-10-06 NOTE — TELEPHONE ENCOUNTER
[facility] scheduling calling.  They need the Echo changed to Echo complete without contrast and faxed to [phone] [place]

## 2023-10-09 LAB
B-TYPE NATRIURETIC PEPTIDE: NORMAL
TSH SERPL DL<=0.05 MIU/L-ACNC: NORMAL M[IU]/L

## 2023-10-10 DIAGNOSIS — I87.2 EDEMA OF BOTH LOWER EXTREMITIES DUE TO PERIPHERAL VENOUS INSUFFICIENCY: ICD-10-CM

## 2023-10-10 DIAGNOSIS — I10 ESSENTIAL HYPERTENSION: ICD-10-CM

## 2023-10-10 DIAGNOSIS — R06.09 DYSPNEA ON EXERTION: ICD-10-CM

## 2023-10-10 DIAGNOSIS — E03.9 HYPOTHYROIDISM, UNSPECIFIED TYPE: ICD-10-CM

## 2023-10-11 NOTE — RESULT ENCOUNTER NOTE
Adv pt results okay- about the same as last year- follow up with pulmonary and cardiology to make sure they don't think there is anything else going on.

## 2023-10-30 ENCOUNTER — OFFICE VISIT (OUTPATIENT)
Dept: PRIMARY CARE CLINIC | Age: 88
End: 2023-10-30
Payer: MEDICARE

## 2023-10-30 VITALS
TEMPERATURE: 98.3 F | SYSTOLIC BLOOD PRESSURE: 126 MMHG | HEART RATE: 62 BPM | DIASTOLIC BLOOD PRESSURE: 78 MMHG | WEIGHT: 123 LBS | BODY MASS INDEX: 21.79 KG/M2 | OXYGEN SATURATION: 98 %

## 2023-10-30 DIAGNOSIS — J06.9 VIRAL URI WITH COUGH: Primary | ICD-10-CM

## 2023-10-30 PROCEDURE — 1123F ACP DISCUSS/DSCN MKR DOCD: CPT | Performed by: FAMILY MEDICINE

## 2023-10-30 PROCEDURE — 99213 OFFICE O/P EST LOW 20 MIN: CPT | Performed by: FAMILY MEDICINE

## 2023-10-30 ASSESSMENT — ENCOUNTER SYMPTOMS
SORE THROAT: 1
DIARRHEA: 0
NAUSEA: 0
EYE REDNESS: 0
RHINORRHEA: 0
COUGH: 0
WHEEZING: 0
SHORTNESS OF BREATH: 0
VOMITING: 0
SINUS PAIN: 0
ABDOMINAL PAIN: 0
EYE DISCHARGE: 0

## 2023-10-30 NOTE — PROGRESS NOTES
[address] PRIMARY CARE  [address] [facility] [address]  Dept: [phone] Whitney Colvin is a 81 y.o. female Established patient, who presents today for her medical conditions/complaints as noted below. Chief Complaint   Patient presents with    Pharyngitis     Pt c/o sore throat, drainage, and cough x3 days       HPI:   Here with a cough, congestion, and sore throat. Symptoms started yesterday. Cough is nonproductive, dry, and consistent throughout the day. Sleeps with a CPAP which helps suppress the cough. Also complaining of generalized fatigue and postnasal drip. Have not been taking any medications for symptoms, just been drinking more water.      Reviewed prior notes: None   Reviewed previous:   None    LDL Calculated (mg/dL)   Date Value   08/14/2021 80   10/25/2018 84   03/02/2017 96       (goal LDL is <100)   BUN (mg/dL)   Date Value   04/12/2023 18     Hemoglobin A1C (%)   Date Value   10/27/2020 5.8     BP Readings from Last 3 Encounters:   10/30/23 126/78   10/02/23 134/84   04/18/23 (!) 156/80          (goal 120/80)    Past Medical History:   Diagnosis Date    Abnormal electrocardiography 10/29/2017    Acute coronary syndrome ([address]) 10/29/2017    Ankle fracture, left 10/28/2016    Arthritis     Bilateral neck pain 12/28/2015    Costochondral chest pain 08/13/2021    Hypertension     Non-healing surgical wound 01/04/2017    Onychomycosis of toenail 07/27/2017    Open wound of ankle 12/21/2016    Thyroid disease       Past Surgical History:   Procedure Laterality Date    ANKLE FRACTURE SURGERY Left 10/18/2016    dr. Damián Coffman Right 04/18/2023    RIGHT CARPAL TUNNEL RELEASE (Right: Hand)    CARPAL TUNNEL RELEASE Right 4/18/2023    RIGHT CARPAL TUNNEL RELEASE performed by Shey Zaman MD at [address] Bilateral 2014    KNEE SURGERY Right 2015 approx    Dr. Graciela Rodriguez-

## 2023-11-13 RX ORDER — LEVOTHYROXINE SODIUM 0.15 MG/1
TABLET ORAL
Qty: 90 TABLET | Refills: 3
Start: 2023-11-13

## 2023-12-04 RX ORDER — LEVOTHYROXINE SODIUM 0.15 MG/1
TABLET ORAL
Qty: 90 TABLET | Refills: 3 | Status: SHIPPED | OUTPATIENT
Start: 2023-12-04

## 2024-04-29 ENCOUNTER — OFFICE VISIT (OUTPATIENT)
Dept: PRIMARY CARE CLINIC | Age: 89
End: 2024-04-29
Payer: MEDICARE

## 2024-04-29 VITALS
DIASTOLIC BLOOD PRESSURE: 66 MMHG | SYSTOLIC BLOOD PRESSURE: 120 MMHG | WEIGHT: 126 LBS | HEART RATE: 67 BPM | BODY MASS INDEX: 22.32 KG/M2 | OXYGEN SATURATION: 98 % | HEIGHT: 63 IN

## 2024-04-29 DIAGNOSIS — E87.1 HYPONATREMIA: ICD-10-CM

## 2024-04-29 DIAGNOSIS — R55 VASOVAGAL SYNCOPE: ICD-10-CM

## 2024-04-29 DIAGNOSIS — E03.9 HYPOTHYROIDISM, UNSPECIFIED TYPE: ICD-10-CM

## 2024-04-29 DIAGNOSIS — M17.0 PRIMARY OSTEOARTHRITIS OF BOTH KNEES: ICD-10-CM

## 2024-04-29 DIAGNOSIS — I10 ESSENTIAL HYPERTENSION: Primary | ICD-10-CM

## 2024-04-29 PROCEDURE — 1123F ACP DISCUSS/DSCN MKR DOCD: CPT | Performed by: FAMILY MEDICINE

## 2024-04-29 PROCEDURE — 99213 OFFICE O/P EST LOW 20 MIN: CPT | Performed by: FAMILY MEDICINE

## 2024-04-29 SDOH — ECONOMIC STABILITY: FOOD INSECURITY: WITHIN THE PAST 12 MONTHS, YOU WORRIED THAT YOUR FOOD WOULD RUN OUT BEFORE YOU GOT MONEY TO BUY MORE.: NEVER TRUE

## 2024-04-29 SDOH — ECONOMIC STABILITY: FOOD INSECURITY: WITHIN THE PAST 12 MONTHS, THE FOOD YOU BOUGHT JUST DIDN'T LAST AND YOU DIDN'T HAVE MONEY TO GET MORE.: NEVER TRUE

## 2024-04-29 SDOH — ECONOMIC STABILITY: INCOME INSECURITY: HOW HARD IS IT FOR YOU TO PAY FOR THE VERY BASICS LIKE FOOD, HOUSING, MEDICAL CARE, AND HEATING?: NOT HARD AT ALL

## 2024-04-29 ASSESSMENT — PATIENT HEALTH QUESTIONNAIRE - PHQ9
SUM OF ALL RESPONSES TO PHQ QUESTIONS 1-9: 0
SUM OF ALL RESPONSES TO PHQ QUESTIONS 1-9: 0
SUM OF ALL RESPONSES TO PHQ9 QUESTIONS 1 & 2: 0
SUM OF ALL RESPONSES TO PHQ QUESTIONS 1-9: 0
SUM OF ALL RESPONSES TO PHQ QUESTIONS 1-9: 0
1. LITTLE INTEREST OR PLEASURE IN DOING THINGS: NOT AT ALL
2. FEELING DOWN, DEPRESSED OR HOPELESS: NOT AT ALL

## 2024-04-29 ASSESSMENT — ENCOUNTER SYMPTOMS
COUGH: 0
SHORTNESS OF BREATH: 0

## 2024-04-29 NOTE — PROGRESS NOTES
[facility] PHYSICIANS  [facility] PRIMARY CARE  [address] [facility] [address]  Dept: [phone]    Rosmery Damon is a 88 y.o. female Established patient, who presents today for her medical conditions/complaints as noted below.      Chief Complaint   Patient presents with    Hypertension     Pt here today for follow-up    Hypothyroidism     Pt asking if she needs labs again after changing dosage last office visit       HPI:   Pt doing okay. Occas episodes of LH/ feeling funny- taking liquid IV and increases salt and does okay.  Sometimes worse in the morning.      Reviewed prior notes: None   Reviewed previous:  Labs    LDL Calculated (mg/dL)   Date Value   08/14/2021 80   10/25/2018 84   03/02/2017 96       (goal LDL is <100)   BUN (mg/dL)   Date Value   04/12/2023 18     Hemoglobin A1C (%)   Date Value   10/27/2020 5.8     BP Readings from Last 3 Encounters:   04/29/24 120/66   10/30/23 126/78   10/02/23 134/84          (goal 120/80)    Past Medical History:   Diagnosis Date    Abnormal electrocardiography 10/29/2017    Acute coronary syndrome (HCC) 10/29/2017    Ankle fracture, left 10/28/2016    Arthritis     Bilateral neck pain 12/28/2015    Costochondral chest pain 08/13/2021    Hypertension     Non-healing surgical wound 01/04/2017    Onychomycosis of toenail 07/27/2017    Open wound of ankle 12/21/2016    Thyroid disease       Past Surgical History:   Procedure Laterality Date    ANKLE FRACTURE SURGERY Left 10/18/2016    dr. armida julien    CARPAL TUNNEL RELEASE Right 04/18/2023    RIGHT CARPAL TUNNEL RELEASE (Right: Hand)    CARPAL TUNNEL RELEASE Right 4/18/2023    RIGHT CARPAL TUNNEL RELEASE performed by Keisha Hicks MD at [facility] OR    CATARACT REMOVAL WITH IMPLANT Bilateral 2014    KNEE SURGERY Right 2015 approx    Dr. julien- replacement    [facility]    [facility]    THYROID SURGERY  1960

## 2024-05-08 ENCOUNTER — HOSPITAL ENCOUNTER (OUTPATIENT)
Age: 89
Discharge: HOME | End: 2024-05-08
Payer: MEDICARE

## 2024-05-08 DIAGNOSIS — R55 VASOVAGAL SYNCOPE: ICD-10-CM

## 2024-05-08 DIAGNOSIS — E03.9 HYPOTHYROIDISM, UNSPECIFIED TYPE: ICD-10-CM

## 2024-05-08 DIAGNOSIS — I10 ESSENTIAL HYPERTENSION: ICD-10-CM

## 2024-05-08 LAB
ALBUMIN SERPL-MCNC: 4.3 G/DL (ref 3.5–5.2)
ALBUMIN/GLOB SERPL: 2 {RATIO} (ref 1–2.5)
ALP SERPL-CCNC: 107 U/L (ref 35–104)
ALT SERPL-CCNC: 13 U/L (ref 10–35)
ANION GAP SERPL CALCULATED.3IONS-SCNC: 11 MMOL/L (ref 9–16)
AST SERPL-CCNC: 28 U/L (ref 10–35)
BASOPHILS # BLD: 0.06 K/UL (ref 0–0.2)
BASOPHILS NFR BLD: 1 % (ref 0–2)
BILIRUB SERPL-MCNC: 0.4 MG/DL (ref 0–1.2)
BUN SERPL-MCNC: 13 MG/DL (ref 8–23)
CALCIUM SERPL-MCNC: 9.1 MG/DL (ref 8.6–10.4)
CHLORIDE SERPL-SCNC: 97 MMOL/L (ref 98–107)
CO2 SERPL-SCNC: 25 MMOL/L (ref 20–31)
CREAT SERPL-MCNC: 0.8 MG/DL (ref 0.5–0.9)
EOSINOPHIL # BLD: 0.22 K/UL (ref 0–0.44)
EOSINOPHILS RELATIVE PERCENT: 3 % (ref 1–4)
ERYTHROCYTE [DISTWIDTH] IN BLOOD BY AUTOMATED COUNT: 12.9 % (ref 11.8–14.4)
GFR, ESTIMATED: 70 ML/MIN/1.73M2
GLUCOSE SERPL-MCNC: 86 MG/DL (ref 74–99)
HCT VFR BLD AUTO: 39.8 % (ref 36.3–47.1)
HGB BLD-MCNC: 13.4 G/DL (ref 11.9–15.1)
IMM GRANULOCYTES # BLD AUTO: 0.04 K/UL (ref 0–0.3)
IMM GRANULOCYTES NFR BLD: 1 %
LYMPHOCYTES NFR BLD: 1.71 K/UL (ref 1.1–3.7)
LYMPHOCYTES RELATIVE PERCENT: 20 % (ref 24–43)
MCH RBC QN AUTO: 31.2 PG (ref 25.2–33.5)
MCHC RBC AUTO-ENTMCNC: 33.7 G/DL (ref 28.4–34.8)
MCV RBC AUTO: 92.8 FL (ref 82.6–102.9)
MONOCYTES NFR BLD: 0.79 K/UL (ref 0.1–1.2)
MONOCYTES NFR BLD: 9 % (ref 3–12)
NEUTROPHILS NFR BLD: 66 % (ref 36–65)
NEUTS SEG NFR BLD: 5.63 K/UL (ref 1.5–8.1)
NRBC BLD-RTO: 0 PER 100 WBC
PLATELET # BLD AUTO: 210 K/UL (ref 138–453)
PMV BLD AUTO: 10.8 FL (ref 8.1–13.5)
POTASSIUM SERPL-SCNC: 4.7 MMOL/L (ref 3.7–5.3)
PROT SERPL-MCNC: 7.1 G/DL (ref 6.6–8.7)
RBC # BLD AUTO: 4.29 M/UL (ref 3.95–5.11)
SODIUM SERPL-SCNC: 133 MMOL/L (ref 136–145)
TSH SERPL DL<=0.05 MIU/L-ACNC: 1.39 UIU/ML (ref 0.27–4.2)
WBC OTHER # BLD: 8.5 K/UL (ref 3.5–11.3)

## 2024-05-08 PROCEDURE — 85025 COMPLETE CBC W/AUTO DIFF WBC: CPT

## 2024-05-08 PROCEDURE — 36415 COLL VENOUS BLD VENIPUNCTURE: CPT

## 2024-05-08 PROCEDURE — 80053 COMPREHEN METABOLIC PANEL: CPT

## 2024-05-08 PROCEDURE — 84443 ASSAY THYROID STIM HORMONE: CPT

## 2024-05-09 NOTE — RESULT ENCOUNTER NOTE
Adv pt results okay, except sodium level is just a little low.  No need to change anything as she has had this in the past.

## 2024-05-29 ENCOUNTER — TELEPHONE (OUTPATIENT)
Dept: PHARMACY | Facility: CLINIC | Age: 89
End: 2024-05-29

## 2024-05-29 NOTE — TELEPHONE ENCOUNTER
[facility] CLINICAL PHARMACY: ADHERENCE REVIEW  Identified care gap per [facility] [place] ACE/ARB adherence    ASSESSMENT    ACE/ARB ADHERENCE    Insurance Records claims through 24 (Prior Year PDC = not reported; YTD PDC = FIRST FILL; Potential Fail Date: 24):   Candesartan 32 mg tablets last filled on 24 for 90 day supply. Next refill due: 24  Prescribed by Jaxon Del Rosario  Per [facility] portal, no refills on that script    BP Readings from Last 3 Encounters:   24 120/66   10/30/23 126/78   10/02/23 134/84     Estimated Creatinine Clearance: 41 mL/min (based on SCr of 0.8 mg/dL).  Lab Results   Component Value Date    CREATININE 0.8 2024     Lab Results   Component Value Date    K 4.7 2024       PLAN  The following are interventions that have been identified:   Patient OVERDUE refilling candesartan and active on home medication list.   Patient NEEDS REFILLS for candesartan    Reached Rosmery to review. She does still takes the candesartan and has a couple weeks remaining. Adherence reviewed. She will reach out to [facility] to request a refill.    No further outreach planned at this time.     Last Visit: 24  Next Visit: 24    Future Appointments   Date Time Provider Department Center   2024  9:30 AM Concepcion Ashby MD [facility]       Corinne James, PharmD, [facility], [facility]  [facility] Clinical Pharmacy  Department, toll free: [phone], option 1    For Pharmacy Admin Tracking Only  Program: [facility]  CPA in place:  No  Recommendation Provided To: Patient/Caregiver: 1 via Telephone  Intervention Detail: Adherence Monitorin  Intervention Accepted By: Patient/Caregiver: 1  Gap Closed?: Yes   Time Spent (min): 15

## 2024-08-29 ENCOUNTER — OFFICE VISIT (OUTPATIENT)
Dept: PRIMARY CARE CLINIC | Age: 89
End: 2024-08-29
Payer: MEDICARE

## 2024-08-29 VITALS
WEIGHT: 126.2 LBS | DIASTOLIC BLOOD PRESSURE: 64 MMHG | OXYGEN SATURATION: 97 % | BODY MASS INDEX: 22.36 KG/M2 | TEMPERATURE: 98.4 F | SYSTOLIC BLOOD PRESSURE: 126 MMHG | HEIGHT: 63 IN | HEART RATE: 52 BPM

## 2024-08-29 DIAGNOSIS — F51.01 PRIMARY INSOMNIA: ICD-10-CM

## 2024-08-29 DIAGNOSIS — G47.33 OBSTRUCTIVE SLEEP APNEA SYNDROME: ICD-10-CM

## 2024-08-29 DIAGNOSIS — Z00.00 MEDICARE ANNUAL WELLNESS VISIT, SUBSEQUENT: Primary | ICD-10-CM

## 2024-08-29 DIAGNOSIS — R05.1 ACUTE COUGH: ICD-10-CM

## 2024-08-29 DIAGNOSIS — K59.1 FUNCTIONAL DIARRHEA: ICD-10-CM

## 2024-08-29 LAB
Lab: ABNORMAL
QC PASS/FAIL: ABNORMAL
SARS-COV-2 RDRP RESP QL NAA+PROBE: POSITIVE

## 2024-08-29 PROCEDURE — 87635 SARS-COV-2 COVID-19 AMP PRB: CPT | Performed by: FAMILY MEDICINE

## 2024-08-29 PROCEDURE — 1123F ACP DISCUSS/DSCN MKR DOCD: CPT | Performed by: FAMILY MEDICINE

## 2024-08-29 PROCEDURE — G0439 PPPS, SUBSEQ VISIT: HCPCS | Performed by: FAMILY MEDICINE

## 2024-08-29 ASSESSMENT — PATIENT HEALTH QUESTIONNAIRE - PHQ9
2. FEELING DOWN, DEPRESSED OR HOPELESS: NOT AT ALL
SUM OF ALL RESPONSES TO PHQ QUESTIONS 1-9: 0
1. LITTLE INTEREST OR PLEASURE IN DOING THINGS: NOT AT ALL
SUM OF ALL RESPONSES TO PHQ9 QUESTIONS 1 & 2: 0
SUM OF ALL RESPONSES TO PHQ QUESTIONS 1-9: 0

## 2024-08-29 NOTE — PROGRESS NOTES
Medicare Annual Wellness Visit    Rosmery Damon is here for Medicare AWV, Hypertension (Pt is here for a 4 Month f/u. ), Cough (Patient is c/o sx started x3 days ago. ), Drainage, and Nasal Congestion    Assessment & Plan   Medicare annual wellness visit, subsequent  Acute cough  -     POCT COVID-19 Rapid, NAAT  Obstructive sleep apnea syndrome  Assessment & Plan:  Continue with mask- benefits from it and uses it regularly   Primary insomnia  Assessment & Plan:  Rotate melatonin and benadryl     Recommendations for Preventive Services Due: see orders and patient instructions/AVS.  Recommended screening schedule for the next 5-10 years is provided to the patient in written form: see Patient Instructions/AVS.     Return in about 4 months (around 12/29/2024) for medication f/u.     Subjective   The following acute and/or chronic problems were also addressed today:  Sick for about 3 days- cough and congestion.  No fever, some sore throat from the drainage.  Tessalon and covid test    Patient's complete Health Risk Assessment and screening values have been reviewed and are found in Flowsheets. The following problems were reviewed today and where indicated follow up appointments were made and/or referrals ordered.    Positive Risk Factor Screenings with Interventions:               General HRA Questions:  Select all that apply: (!) New or Increased Fatigue  Interventions Fatigue:  Does not sleep- took benadryl and that worked well for 3 days, then did not work anymore.  Wearing mask nightly                            Objective   Vitals:    08/29/24 0911   BP: 126/64   Pulse: 52   Temp: 98.4 °F (36.9 °C)   SpO2: 97%   Weight: 57.2 kg (126 lb 3.2 oz)   Height: 1.6 m (5' 2.99\")      Body mass index is 22.36 kg/m².      Physical Exam  Vitals and nursing note reviewed.   Constitutional:       General: She is not in acute distress.     Appearance: She is well-developed. She is not ill-appearing.   HENT:      Head:

## 2024-08-29 NOTE — PATIENT INSTRUCTIONS
Fatigue: Care Instructions  Overview     Fatigue is a feeling of tiredness, exhaustion, or lack of energy. You may feel fatigue because of too much or not enough activity. It can also come from stress, lack of sleep, boredom, and poor diet. Many medical problems, such as viral infections, can cause fatigue. Emotional problems, especially depression, are often the cause of fatigue.  Fatigue is most often a symptom of another problem. Treatment for fatigue depends on the cause. For example, if you have fatigue because you have a certain health problem, treating this problem also treats your fatigue. If depression or anxiety is the cause, treatment may help.  Follow-up care is a key part of your treatment and safety. Be sure to make and go to all appointments, and call your doctor if you are having problems. It's also a good idea to know your test results and keep a list of the medicines you take.  How can you care for yourself at home?  Get regular exercise. But try not to overdo it. It may help to go back and forth between rest and exercise.  Get plenty of rest.  Eat a variety of healthy foods. Try not to skip any meals.  Avoid or try to cut back on your use of caffeine, tobacco, and alcohol. Caffeine is most often found in coffee, tea, cola drinks, and energy drinks.  Limit medicines that can cause fatigue. These include medicines such as cold and allergy medicines.  When should you call for help?  Watch closely for changes in your health, and be sure to contact your doctor if:    You have new symptoms such as fever or a rash.     Your fatigue gets worse.     You have been feeling down, depressed, or hopeless. Or you may have lost interest in things that you usually enjoy.     You are not getting better as expected.   Where can you learn more?  Go to https://www.healthwise.net/patientEd and enter W864 to learn more about \"Fatigue: Care Instructions.\"  Current as of: June 24, 2023  Content Version: 14.1  ©  1410-1516 [employer].   Care instructions adapted under license by [employer]. If you have questions about a medical condition or this instruction, always ask your healthcare professional. [employer] disclaims any warranty or liability for your use of this information.           A Healthy Heart: Care Instructions  Overview     Coronary artery disease, also called heart disease, occurs when a substance called plaque builds up in the vessels that supply oxygen-rich blood to your heart muscle. This can narrow the blood vessels and reduce blood flow. A heart attack happens when blood flow is completely blocked. A high-fat diet, smoking, and other factors increase the risk of heart disease.  Your doctor has found that you have a chance of having heart disease. A heart-healthy lifestyle can help keep your heart healthy and prevent heart disease. This lifestyle includes eating healthy, being active, staying at a weight that's healthy for you, and not smoking or using tobacco. It also includes taking medicines as directed, managing other health conditions, and trying to get a healthy amount of sleep.  Follow-up care is a key part of your treatment and safety. Be sure to make and go to all appointments, and call your doctor if you are having problems. It's also a good idea to know your test results and keep a list of the medicines you take.  How can you care for yourself at home?  Diet    Use less salt when you cook and eat. This helps lower your blood pressure. Taste food before salting. Add only a little salt when you think you need it. With time, your taste buds will adjust to less salt.     Eat fewer snack items, fast foods, canned soups, and other high-salt, high-fat, processed foods.     Read food labels and try to avoid saturated and trans fats. They increase your risk of heart disease by raising cholesterol levels.     Limit the amount of solid fat--butter, margarine, and shortening--you

## 2024-09-06 ENCOUNTER — TELEPHONE (OUTPATIENT)
Dept: PRIMARY CARE CLINIC | Age: 89
End: 2024-09-06

## 2024-09-06 NOTE — TELEPHONE ENCOUNTER
Patient having low energy and being tired since having covid. Asking if there's anything she can do to increase her energy level?

## 2024-09-06 NOTE — TELEPHONE ENCOUNTER
Make sure she is staying hydrated and trying to get a little activity every day.  Will just take a while.  But has to stay active to keep her strength up.

## 2024-10-16 RX ORDER — LEVOTHYROXINE SODIUM 150 UG/1
TABLET ORAL
Qty: 90 TABLET | Refills: 2 | Status: SHIPPED | OUTPATIENT
Start: 2024-10-16

## 2025-01-06 ENCOUNTER — OFFICE VISIT (OUTPATIENT)
Dept: PRIMARY CARE CLINIC | Age: 89
End: 2025-01-06
Payer: MEDICARE

## 2025-01-06 VITALS
BODY MASS INDEX: 22.25 KG/M2 | OXYGEN SATURATION: 97 % | HEIGHT: 63 IN | SYSTOLIC BLOOD PRESSURE: 130 MMHG | WEIGHT: 125.6 LBS | HEART RATE: 61 BPM | DIASTOLIC BLOOD PRESSURE: 84 MMHG

## 2025-01-06 DIAGNOSIS — E03.9 HYPOTHYROIDISM, UNSPECIFIED TYPE: ICD-10-CM

## 2025-01-06 DIAGNOSIS — I10 ESSENTIAL HYPERTENSION: Primary | ICD-10-CM

## 2025-01-06 DIAGNOSIS — R12 HEARTBURN: ICD-10-CM

## 2025-01-06 PROCEDURE — 1123F ACP DISCUSS/DSCN MKR DOCD: CPT | Performed by: FAMILY MEDICINE

## 2025-01-06 PROCEDURE — 1159F MED LIST DOCD IN RCRD: CPT | Performed by: FAMILY MEDICINE

## 2025-01-06 PROCEDURE — 99213 OFFICE O/P EST LOW 20 MIN: CPT | Performed by: FAMILY MEDICINE

## 2025-01-06 RX ORDER — AMLODIPINE BESYLATE 5 MG/1
5 TABLET ORAL 2 TIMES DAILY
Qty: 60 TABLET | Refills: 3
Start: 2025-01-06

## 2025-01-06 RX ORDER — METOPROLOL TARTRATE 50 MG
25 TABLET ORAL 2 TIMES DAILY
Qty: 30 TABLET | Refills: 3
Start: 2025-01-06

## 2025-01-06 ASSESSMENT — ENCOUNTER SYMPTOMS
ABDOMINAL PAIN: 1
SHORTNESS OF BREATH: 0
COUGH: 0

## 2025-01-06 NOTE — ASSESSMENT & PLAN NOTE
Labs in spring  
 Chronic, at goal (stable), continue current treatment plan  
Tums prn.  Info on foods to avoid given   
None

## 2025-01-06 NOTE — PROGRESS NOTES
Plan:   Chronic, at goal (stable), continue current treatment plan  Orders:  -     Comprehensive Metabolic Panel; Future  -     CBC with Auto Differential; Future  -     TSH; Future  2. Heartburn  Assessment & Plan:  Tums prn.  Info on foods to avoid given   Orders:  -     Comprehensive Metabolic Panel; Future  -     CBC with Auto Differential; Future  -     TSH; Future  3. Hypothyroidism, unspecified type  Assessment & Plan:    Labs in spring  Orders:  -     Comprehensive Metabolic Panel; Future  -     CBC with Auto Differential; Future  -     TSH; Future       Return in about 4 months (around 5/6/2025) for MWV.  Data Unavailable     Orders Placed This Encounter   Procedures    Comprehensive Metabolic Panel     Standing Status:   Future     Standing Expiration Date:   1/7/2026    CBC with Auto Differential     Standing Status:   Future     Standing Expiration Date:   1/7/2026    TSH     Standing Status:   Future     Standing Expiration Date:   1/7/2026     Orders Placed This Encounter   Medications    amLODIPine (NORVASC) 5 MG tablet     Sig: Take 1 tablet by mouth 2 times daily     Dispense:  60 tablet     Refill:  3    metoprolol tartrate (LOPRESSOR) 50 MG tablet     Sig: Take 0.5 tablets by mouth 2 times daily     Dispense:  30 tablet     Refill:  3       Patient given educational materials - see patient instructions.  Discussed use, benefit, and side effects of prescribed medications.  All patient questions answered. Pt voiced understanding. Reviewed health maintenance.  Instructed to continue current medications, diet and exercise.  Patient agreed with treatment plan. Follow up as directed.     Electronically signed by Concepcion Ashby MD on 1/6/2025 at 10:06 AM

## 2025-02-03 ENCOUNTER — OFFICE VISIT (OUTPATIENT)
Dept: PRIMARY CARE CLINIC | Age: 89
End: 2025-02-03
Payer: MEDICARE

## 2025-02-03 VITALS
HEIGHT: 63 IN | BODY MASS INDEX: 22.43 KG/M2 | OXYGEN SATURATION: 97 % | HEART RATE: 84 BPM | SYSTOLIC BLOOD PRESSURE: 136 MMHG | DIASTOLIC BLOOD PRESSURE: 76 MMHG | WEIGHT: 126.6 LBS | TEMPERATURE: 99.5 F

## 2025-02-03 DIAGNOSIS — J10.1 INFLUENZA A: Primary | ICD-10-CM

## 2025-02-03 DIAGNOSIS — R05.1 ACUTE COUGH: ICD-10-CM

## 2025-02-03 LAB
INFLUENZA A ANTIGEN, POC: POSITIVE
INFLUENZA B ANTIGEN, POC: NEGATIVE
Lab: NORMAL
QC PASS/FAIL: NORMAL
RSV RNA: NORMAL
SARS-COV-2 RDRP RESP QL NAA+PROBE: NEGATIVE
VALID INTERNAL CONTROL, POC: ABNORMAL

## 2025-02-03 PROCEDURE — 99213 OFFICE O/P EST LOW 20 MIN: CPT | Performed by: FAMILY MEDICINE

## 2025-02-03 PROCEDURE — 1159F MED LIST DOCD IN RCRD: CPT | Performed by: FAMILY MEDICINE

## 2025-02-03 PROCEDURE — 87502 INFLUENZA DNA AMP PROBE: CPT | Performed by: FAMILY MEDICINE

## 2025-02-03 PROCEDURE — 1123F ACP DISCUSS/DSCN MKR DOCD: CPT | Performed by: FAMILY MEDICINE

## 2025-02-03 PROCEDURE — 87635 SARS-COV-2 COVID-19 AMP PRB: CPT | Performed by: FAMILY MEDICINE

## 2025-02-03 PROCEDURE — 87634 RSV DNA/RNA AMP PROBE: CPT | Performed by: FAMILY MEDICINE

## 2025-02-03 RX ORDER — OSELTAMIVIR PHOSPHATE 75 MG/1
75 CAPSULE ORAL 2 TIMES DAILY
Qty: 10 CAPSULE | Refills: 0 | Status: SHIPPED | OUTPATIENT
Start: 2025-02-03 | End: 2025-02-08

## 2025-02-03 SDOH — ECONOMIC STABILITY: FOOD INSECURITY: WITHIN THE PAST 12 MONTHS, YOU WORRIED THAT YOUR FOOD WOULD RUN OUT BEFORE YOU GOT MONEY TO BUY MORE.: NEVER TRUE

## 2025-02-03 SDOH — ECONOMIC STABILITY: FOOD INSECURITY: WITHIN THE PAST 12 MONTHS, THE FOOD YOU BOUGHT JUST DIDN'T LAST AND YOU DIDN'T HAVE MONEY TO GET MORE.: NEVER TRUE

## 2025-02-03 ASSESSMENT — ENCOUNTER SYMPTOMS
COUGH: 1
NAUSEA: 1
SHORTNESS OF BREATH: 0
DIARRHEA: 0

## 2025-02-03 ASSESSMENT — PATIENT HEALTH QUESTIONNAIRE - PHQ9
SUM OF ALL RESPONSES TO PHQ QUESTIONS 1-9: 0
SUM OF ALL RESPONSES TO PHQ QUESTIONS 1-9: 0
2. FEELING DOWN, DEPRESSED OR HOPELESS: NOT AT ALL
SUM OF ALL RESPONSES TO PHQ QUESTIONS 1-9: 0
1. LITTLE INTEREST OR PLEASURE IN DOING THINGS: NOT AT ALL
SUM OF ALL RESPONSES TO PHQ9 QUESTIONS 1 & 2: 0
SUM OF ALL RESPONSES TO PHQ QUESTIONS 1-9: 0

## 2025-02-03 NOTE — PROGRESS NOTES
[facility] PHYSICIANS  [facility] PRIMARY CARE  [address] [address]  [facility] [address]  Dept: [phone]    Rosmery Damon is a 89 y.o. female Established patient, who presents today for her medical conditions/complaints as noted below.      Chief Complaint   Patient presents with    Cough     Patient complains of cough and runny nose. SX started saturday Patient denies ear pain and sore throat.    Hypertension     Patient taking 1 tab of metoprolol bid - states blood pressure has been low after the 1st       HPI:   Started with cough, runny nose and fatigue and ST.  Head hurts- thinks from coughing.  Non productive cough.    Took some liquid IV and lots of water.      Reviewed prior notes: None   Reviewed previous:  Labs    No components found for: \"LDLCHOLESTEROL\", \"LDLCALC\"    (goal LDL is <100)   AST (U/L)   Date Value   05/08/2024 28     ALT (U/L)   Date Value   05/08/2024 13     BUN (mg/dL)   Date Value   05/08/2024 13     Hemoglobin A1C (%)   Date Value   10/27/2020 5.8     TSH (uIU/mL)   Date Value   05/08/2024 1.39     BP Readings from Last 3 Encounters:   02/03/25 136/76   01/06/25 130/84   08/29/24 126/64          (goal 120/80)    Past Medical History:   Diagnosis Date    Abnormal electrocardiography 10/29/2017    Acute coronary syndrome (HCC) 10/29/2017    Ankle fracture, left 10/28/2016    Arthritis     Bilateral neck pain 12/28/2015    Costochondral chest pain 08/13/2021    Hypertension     Non-healing surgical wound 01/04/2017    Onychomycosis of toenail 07/27/2017    Open wound of ankle 12/21/2016    Thyroid disease       Past Surgical History:   Procedure Laterality Date    ANKLE FRACTURE SURGERY Left 10/18/2016    dr. armida julien    CARPAL TUNNEL RELEASE Right 04/18/2023    RIGHT CARPAL TUNNEL RELEASE performed by Keisha Hicks MD at [facility] OR    CATARACT REMOVAL WITH IMPLANT Bilateral 2014    [facility]    [facility]ROID [facility]  [facility]

## 2025-04-14 ENCOUNTER — HOSPITAL ENCOUNTER (OUTPATIENT)
Age: 89
Discharge: HOME | End: 2025-04-14
Payer: MEDICARE

## 2025-04-14 ENCOUNTER — RESULTS FOLLOW-UP (OUTPATIENT)
Dept: PRIMARY CARE CLINIC | Age: 89
End: 2025-04-14

## 2025-04-14 DIAGNOSIS — R12 HEARTBURN: ICD-10-CM

## 2025-04-14 DIAGNOSIS — E03.9 HYPOTHYROIDISM, UNSPECIFIED TYPE: ICD-10-CM

## 2025-04-14 DIAGNOSIS — I10 ESSENTIAL HYPERTENSION: ICD-10-CM

## 2025-04-14 LAB
ALBUMIN SERPL-MCNC: 4 G/DL (ref 3.5–5.2)
ALBUMIN/GLOB SERPL: 1.4 {RATIO} (ref 1–2.5)
ALP SERPL-CCNC: 123 U/L (ref 35–104)
ALT SERPL-CCNC: 17 U/L (ref 10–35)
ANION GAP SERPL CALCULATED.3IONS-SCNC: 10 MMOL/L (ref 9–16)
AST SERPL-CCNC: 24 U/L (ref 10–35)
BASOPHILS # BLD: 0.06 K/UL (ref 0–0.2)
BASOPHILS NFR BLD: 1 % (ref 0–2)
BILIRUB SERPL-MCNC: 0.4 MG/DL (ref 0–1.2)
BUN SERPL-MCNC: 13 MG/DL (ref 8–23)
CALCIUM SERPL-MCNC: 9 MG/DL (ref 8.6–10.4)
CHLORIDE SERPL-SCNC: 99 MMOL/L (ref 98–107)
CO2 SERPL-SCNC: 26 MMOL/L (ref 20–31)
CREAT SERPL-MCNC: 0.9 MG/DL (ref 0.6–0.9)
EOSINOPHIL # BLD: 0.12 K/UL (ref 0–0.44)
EOSINOPHILS RELATIVE PERCENT: 2 % (ref 1–4)
ERYTHROCYTE [DISTWIDTH] IN BLOOD BY AUTOMATED COUNT: 14.2 % (ref 11.8–14.4)
GFR, ESTIMATED: 61 ML/MIN/1.73M2
GLUCOSE SERPL-MCNC: 86 MG/DL (ref 74–99)
HCT VFR BLD AUTO: 38.3 % (ref 36.3–47.1)
HGB BLD-MCNC: 14.1 G/DL (ref 11.9–15.1)
IMM GRANULOCYTES # BLD AUTO: <0.03 K/UL (ref 0–0.3)
IMM GRANULOCYTES NFR BLD: 0 %
LYMPHOCYTES NFR BLD: 1.14 K/UL (ref 1.1–3.7)
LYMPHOCYTES RELATIVE PERCENT: 20 % (ref 24–43)
MCH RBC QN AUTO: 34.2 PG (ref 25.2–33.5)
MCHC RBC AUTO-ENTMCNC: 36.8 G/DL (ref 28.4–34.8)
MCV RBC AUTO: 93 FL (ref 82.6–102.9)
MONOCYTES NFR BLD: 0.65 K/UL (ref 0.1–1.2)
MONOCYTES NFR BLD: 12 % (ref 3–12)
NEUTROPHILS NFR BLD: 65 % (ref 36–65)
NEUTS SEG NFR BLD: 3.6 K/UL (ref 1.5–8.1)
NRBC BLD-RTO: 0 PER 100 WBC
PLATELET # BLD AUTO: 285 K/UL (ref 138–453)
PMV BLD AUTO: 10.4 FL (ref 8.1–13.5)
POTASSIUM SERPL-SCNC: 4.7 MMOL/L (ref 3.7–5.3)
PROT SERPL-MCNC: 6.8 G/DL (ref 6.6–8.7)
RBC # BLD AUTO: 4.12 M/UL (ref 3.95–5.11)
SODIUM SERPL-SCNC: 135 MMOL/L (ref 136–145)
TSH SERPL DL<=0.05 MIU/L-ACNC: 3.11 UIU/ML (ref 0.27–4.2)
WBC OTHER # BLD: 5.6 K/UL (ref 3.5–11.3)

## 2025-04-14 PROCEDURE — 80053 COMPREHEN METABOLIC PANEL: CPT

## 2025-04-14 PROCEDURE — 36415 COLL VENOUS BLD VENIPUNCTURE: CPT

## 2025-04-14 PROCEDURE — 84443 ASSAY THYROID STIM HORMONE: CPT

## 2025-04-14 PROCEDURE — 85025 COMPLETE CBC W/AUTO DIFF WBC: CPT

## 2025-05-07 ENCOUNTER — OFFICE VISIT (OUTPATIENT)
Dept: PRIMARY CARE CLINIC | Age: 89
End: 2025-05-07
Payer: MEDICARE

## 2025-05-07 VITALS
HEIGHT: 63 IN | DIASTOLIC BLOOD PRESSURE: 68 MMHG | SYSTOLIC BLOOD PRESSURE: 126 MMHG | WEIGHT: 122.8 LBS | HEART RATE: 58 BPM | BODY MASS INDEX: 21.76 KG/M2 | OXYGEN SATURATION: 98 %

## 2025-05-07 DIAGNOSIS — Z12.31 ENCOUNTER FOR SCREENING MAMMOGRAM FOR BREAST CANCER: ICD-10-CM

## 2025-05-07 DIAGNOSIS — Z00.00 MEDICARE ANNUAL WELLNESS VISIT, SUBSEQUENT: Primary | ICD-10-CM

## 2025-05-07 PROCEDURE — G0439 PPPS, SUBSEQ VISIT: HCPCS | Performed by: FAMILY MEDICINE

## 2025-05-07 PROCEDURE — 1159F MED LIST DOCD IN RCRD: CPT | Performed by: FAMILY MEDICINE

## 2025-05-07 PROCEDURE — 1123F ACP DISCUSS/DSCN MKR DOCD: CPT | Performed by: FAMILY MEDICINE

## 2025-05-07 SDOH — ECONOMIC STABILITY: FOOD INSECURITY: WITHIN THE PAST 12 MONTHS, THE FOOD YOU BOUGHT JUST DIDN'T LAST AND YOU DIDN'T HAVE MONEY TO GET MORE.: NEVER TRUE

## 2025-05-07 SDOH — ECONOMIC STABILITY: FOOD INSECURITY: WITHIN THE PAST 12 MONTHS, YOU WORRIED THAT YOUR FOOD WOULD RUN OUT BEFORE YOU GOT MONEY TO BUY MORE.: NEVER TRUE

## 2025-05-07 ASSESSMENT — PATIENT HEALTH QUESTIONNAIRE - PHQ9
SUM OF ALL RESPONSES TO PHQ QUESTIONS 1-9: 0
1. LITTLE INTEREST OR PLEASURE IN DOING THINGS: NOT AT ALL
2. FEELING DOWN, DEPRESSED OR HOPELESS: NOT AT ALL
SUM OF ALL RESPONSES TO PHQ QUESTIONS 1-9: 0

## 2025-05-07 ASSESSMENT — LIFESTYLE VARIABLES
HOW OFTEN DO YOU HAVE A DRINK CONTAINING ALCOHOL: MONTHLY OR LESS
HOW MANY STANDARD DRINKS CONTAINING ALCOHOL DO YOU HAVE ON A TYPICAL DAY: 1 OR 2

## 2025-05-07 NOTE — PROGRESS NOTES
Medicare Annual Wellness Visit    Rosmery Damon is here for Medicare AWV (Pt here today for Medicare Annual Wellness Visit. Pt given three words to remember: phone, happy, blue. Time given to draw was 11:15./)    Assessment & Plan  1. Medicare wellness visit.  - She is up to date with her pneumonia and shingles vaccines.  - Advised to consider the RSV vaccine, COVID-19 vaccine, and tetanus whooping cough vaccine, especially if she will be around newborns.  - Discussed the option of scheduling a mammogram and the implications of potential findings.  - Recommended the use of compression socks on days with prolonged standing.  Medicare annual wellness visit, subsequent  Encounter for screening mammogram for breast cancer  -     SHAKIR DIGITAL SCREEN W OR WO CAD BILATERAL; Future    Results       No follow-ups on file.     Subjective     History of Present Illness  The patient presents for a Medicare wellness visit.    She reports no current health issues. There is ankle swelling at the end of the day, which she attributes to prolonged periods of standing. Compression socks are available for use. She has not undergone a mammogram in several years but expresses interest in scheduling one. Preventative care is up to date, including pneumonia and shingles vaccines.     SOCIAL HISTORY  She does not smoke. She drinks alcohol a few times a year. She has no history of drug use.    FAMILY HISTORY  Her mother had heart issues and a stroke. Her father had diabetes and heart issues. There are multiple cancers on her mother's side of the family.    Patient's complete Health Risk Assessment and screening values have been reviewed and are found in Flowsheets. The following problems were reviewed today and where indicated follow up appointments were made and/or referrals ordered.    No Positive Risk Factors identified today.                                    Objective   Vitals:    05/07/25 1009   BP: 126/68   Pulse: 58   SpO2: 98%

## 2025-05-21 ENCOUNTER — TELEPHONE (OUTPATIENT)
Dept: PRIMARY CARE CLINIC | Age: 89
End: 2025-05-21

## 2025-05-21 NOTE — TELEPHONE ENCOUNTER
Patient asked if she can drop off DNR papers to the [place] office for Armen to sign. Advised patient she can but would probably be better dropping them off at our office for her pcp Dr. Ashby to sign.

## 2025-05-21 NOTE — TELEPHONE ENCOUNTER
Not really necessary to be seen as long as the gash is healing okay over the next few days. Is her  okay?

## 2025-05-21 NOTE — TELEPHONE ENCOUNTER
Patient called in stating yesterday afternoon 5/20 she was walking with her  at [employer],  tripped over his cane and fell. Patient was trying to grab onto him during his fall and she fell down with him. She hit her right shin on his cane and has a knot the size of an egg, bruise & about a 2 inch gash. Patient denies hitting her head, no dizziness, headaches or blurred vision. Blood pressure today is 143/74. Patient wants to know if she should come in to be seen? Advised to check with pcp per her nurse that comes to see her.    -Please advise  Pharmacy confirmed

## 2025-05-22 NOTE — TELEPHONE ENCOUNTER
Yes, I can sign them if she drops them off at the [place] office. The next couple days the [place] office is closed. I will be in and out of the office as I do rounds at the [facility]. If he brings them the next couple days she can slide them under the door or she can drop them off next week.

## 2025-05-29 ENCOUNTER — OFFICE VISIT (OUTPATIENT)
Dept: PRIMARY CARE CLINIC | Age: 89
End: 2025-05-29

## 2025-05-29 VITALS
OXYGEN SATURATION: 98 % | HEART RATE: 61 BPM | SYSTOLIC BLOOD PRESSURE: 126 MMHG | WEIGHT: 124.2 LBS | HEIGHT: 63 IN | BODY MASS INDEX: 22.01 KG/M2 | DIASTOLIC BLOOD PRESSURE: 68 MMHG

## 2025-05-29 DIAGNOSIS — Z71.89 DNR (DO NOT RESUSCITATE) DISCUSSION: ICD-10-CM

## 2025-05-29 DIAGNOSIS — T14.8XXA HEMATOMA: ICD-10-CM

## 2025-05-29 DIAGNOSIS — Z71.1 CONCERN ABOUT END OF LIFE: Primary | ICD-10-CM

## 2025-05-29 DIAGNOSIS — S81.811A LACERATION OF RIGHT LOWER LEG, INITIAL ENCOUNTER: ICD-10-CM

## 2025-05-29 RX ORDER — AMLODIPINE BESYLATE 2.5 MG/1
2.5 TABLET ORAL DAILY
COMMUNITY
Start: 2025-05-07

## 2025-05-29 NOTE — PROGRESS NOTES
[facility]YSICIANS  [facility] PRIMARY CARE  [address] [facility] [address]  Dept: [phone]    Rosmery Damon is a 89 y.o. female Established patient, who presents today for her medical conditions/complaints as noted below.      Chief Complaint   Patient presents with    Fall     Fell in a parking lot 9 days ago  Fell onto husbands cane    DNR     DNR Paperwork       HPI:     History of Present Illness  The patient presents for evaluation of a fall and DNR paperwork.    Nine days ago, she experienced a fall in the [employer] parking lot, landing on her 's cane, which was a 3-pronged device with rubber tips. She declined immediate medical attention from EMS, opting instead for evaluation by the medical staff at her residence. The nurses there provided initial care. She reports that the area of impact was initially the size of a small egg, with visible skin splitting. Over the past 9 days, the size of the affected area has decreased, but it remains tender and swollen. She also notes bruising on her ankle. She reports no systemic symptoms such as fever or chills. She has been managing the swelling by elevating her leg, which typically begins to swell around noon and increases in size by late afternoon to evening. She is currently on amlodipine for blood pressure management and is concerned that this may be contributing to the swelling. She has not applied ice to the area in recent days and is seeking advice on whether this would be beneficial.    Her  has been diagnosed with Parkinson's disease for 17 years, which remained stable until approximately 1.5 years ago. He believes that his dopamine levels have significantly decreased, necessitating medication. He is aware that his condition is progressive and that his current medication may eventually become ineffective. He does not wish to have his life artificially prolonged. During a hospital stay, he experienced hot

## 2025-05-30 ASSESSMENT — ENCOUNTER SYMPTOMS: COLOR CHANGE: 1

## 2025-07-01 ENCOUNTER — CLINICAL SUPPORT (OUTPATIENT)
Dept: PRIMARY CARE CLINIC | Age: 89
End: 2025-07-01

## 2025-07-01 ENCOUNTER — HOSPITAL ENCOUNTER (OUTPATIENT)
Age: 89
Setting detail: SPECIMEN
Discharge: HOME | End: 2025-07-01

## 2025-07-01 VITALS
DIASTOLIC BLOOD PRESSURE: 68 MMHG | SYSTOLIC BLOOD PRESSURE: 126 MMHG | WEIGHT: 124 LBS | BODY MASS INDEX: 21.97 KG/M2 | OXYGEN SATURATION: 98 % | HEART RATE: 72 BPM

## 2025-07-01 DIAGNOSIS — S81.811A LACERATION OF RIGHT LOWER LEG, INITIAL ENCOUNTER: ICD-10-CM

## 2025-07-01 DIAGNOSIS — S81.811A LACERATION OF RIGHT LOWER LEG, INITIAL ENCOUNTER: Primary | ICD-10-CM

## 2025-07-01 NOTE — PROGRESS NOTES
Wound Care Documentation:    Left lower leg wound cleansed with soap and water and dried. Large approximately 7 cm eschar area with swelling and minimal erythema. Area is tender. Minimal lifting of eschar around the edge of eschar. No drainage present currently but she reports some drainage at night.     Aerobic and anaerobic wound culture obtained from under lifted edge of eschar.    Referral entered to [facility].     Triple antibiotic cream applied and covered with dry sterile dressing.

## 2025-07-04 LAB
MICROORGANISM SPEC CULT: ABNORMAL
MICROORGANISM/AGENT SPEC: ABNORMAL
MICROORGANISM/AGENT SPEC: ABNORMAL
SPECIMEN DESCRIPTION: ABNORMAL

## 2025-07-07 ENCOUNTER — RESULTS FOLLOW-UP (OUTPATIENT)
Dept: PRIMARY CARE CLINIC | Age: 89
End: 2025-07-07

## 2025-07-07 ENCOUNTER — TELEPHONE (OUTPATIENT)
Dept: PRIMARY CARE CLINIC | Age: 89
End: 2025-07-07

## 2025-07-07 DIAGNOSIS — T14.8XXA WOUND INFECTION: Primary | ICD-10-CM

## 2025-07-07 DIAGNOSIS — L08.9 WOUND INFECTION: Primary | ICD-10-CM

## 2025-07-07 RX ORDER — LEVOFLOXACIN 500 MG/1
500 TABLET, FILM COATED ORAL DAILY
Qty: 10 TABLET | Refills: 0 | Status: SHIPPED | OUTPATIENT
Start: 2025-07-07 | End: 2025-07-17

## 2025-07-07 NOTE — TELEPHONE ENCOUNTER
Patient called in regarding wound culture done on 7/1/25. Advised of Armen Hernandes's message of \" Culture show wound infection. Antibiotic sent to [facility]. \"    -Patient verbally understands with no questions or concerns at this time.

## 2025-07-09 NOTE — DISCHARGE INSTRUCTIONS
your PCP or go to the nearest emergency room.     The information contained in the After Visit Summary has been reviewed with me, the patient and/or responsible adult, by my health care provider(s). I had the opportunity to ask questions regarding this information. I have elected to receive;      []After Visit Summary  [x]Comprehensive Discharge Instruction      Patient signature______________________________________Date:________  Electronically signed by Jason Watts RN on 7/10/2025 at 2:13 PM   Electronically signed by FRANKLIN FRANCOIS - CNP on 7/10/2025 at 12:58 PM

## 2025-07-10 ENCOUNTER — HOSPITAL ENCOUNTER (OUTPATIENT)
Dept: WOUND CARE | Age: 89
Discharge: HOME | End: 2025-07-10
Payer: MEDICARE

## 2025-07-10 VITALS
WEIGHT: 122 LBS | HEIGHT: 64 IN | HEART RATE: 77 BPM | SYSTOLIC BLOOD PRESSURE: 151 MMHG | RESPIRATION RATE: 20 BRPM | TEMPERATURE: 97.4 F | BODY MASS INDEX: 20.83 KG/M2 | DIASTOLIC BLOOD PRESSURE: 65 MMHG

## 2025-07-10 DIAGNOSIS — L97.912 LEG ULCER, RIGHT, WITH FAT LAYER EXPOSED (HCC): Primary | ICD-10-CM

## 2025-07-10 PROCEDURE — 11042 DBRDMT SUBQ TIS 1ST 20SQCM/<: CPT

## 2025-07-10 PROCEDURE — 99203 OFFICE O/P NEW LOW 30 MIN: CPT

## 2025-07-10 RX ORDER — LIDOCAINE HYDROCHLORIDE 40 MG/ML
SOLUTION TOPICAL PRN
OUTPATIENT
Start: 2025-07-10

## 2025-07-10 RX ORDER — LIDOCAINE HYDROCHLORIDE 20 MG/ML
JELLY TOPICAL PRN
OUTPATIENT
Start: 2025-07-10

## 2025-07-10 ASSESSMENT — PAIN SCALES - GENERAL: PAINLEVEL_OUTOF10: 3

## 2025-07-10 ASSESSMENT — PAIN DESCRIPTION - DESCRIPTORS: DESCRIPTORS: ACHING

## 2025-07-10 ASSESSMENT — ENCOUNTER SYMPTOMS
COUGH: 0
SHORTNESS OF BREATH: 0
VOMITING: 0
NAUSEA: 0
RHINORRHEA: 0
DIARRHEA: 0

## 2025-07-10 ASSESSMENT — PAIN DESCRIPTION - FREQUENCY: FREQUENCY: CONTINUOUS

## 2025-07-10 ASSESSMENT — PAIN DESCRIPTION - ORIENTATION: ORIENTATION: RIGHT;LOWER

## 2025-07-10 ASSESSMENT — PAIN DESCRIPTION - ONSET: ONSET: ON-GOING

## 2025-07-10 ASSESSMENT — PAIN DESCRIPTION - LOCATION: LOCATION: LEG

## 2025-07-10 NOTE — PROGRESS NOTES
[facility]   Progress Note and Procedure Note      Rosmery Damon  MEDICAL RECORD NUMBER:  [id number]  AGE: 89 y.o.   GENDER: female  : 1935  EPISODE DATE:  7/10/2025    Subjective:     Chief Complaint   Patient presents with    Wound Check     Right lower leg         HISTORY of PRESENT ILLNESS HPI     Rosmery Damon is a 89 y.o. female who presents today for wound/ulcer evaluation.   History of Wound Context: presents with  and daughter for initial wound clinic evaluation on right lower leg wound. Fell on her 's cane 2025 in [employer] parking lot. Wound has not healed. Recently saw PCP and culture taken. Started on levaquin which she will finish 2025.   Wound/Ulcer Pain Timing/Severity: intermittent  Quality of pain: burning  Severity:  3 / 10   Modifying Factors: None  Associated Signs/Symptoms: none    Ulcer Identification:  Ulcer Type: traumatic  Contributing Factors: venous stasis and decreased mobility         PAST MEDICAL HISTORY        Diagnosis Date    Abnormal electrocardiography 10/29/2017    Acute coronary syndrome (HCC) 10/29/2017    Ankle fracture, left 10/28/2016    Arthritis     Bilateral neck pain 2015    Costochondral chest pain 2021    Hypertension     Non-healing surgical wound 2017    Onychomycosis of toenail 2017    Open wound of ankle 2016    Thyroid disease        PAST SURGICAL HISTORY    Past Surgical History:   Procedure Laterality Date    ANKLE FRACTURE SURGERY Left 10/18/2016    dr. armida lennon    CARPAL TUNNEL RELEASE Right 2023    RIGHT CARPAL TUNNEL RELEASE performed by Keisha Hicks MD at [facility] OR    CATARACT REMOVAL WITH IMPLANT Bilateral     SHOULDER ARTHROSCOPY Left     anaya    THYROID SURGERY  1960 approx    goiter    TONSILLECTOMY  195    TOTAL KNEE ARTHROPLASTY Right     Dr. Lennon       FAMILY HISTORY    Family History   Problem Relation Age

## 2025-07-17 ENCOUNTER — HOSPITAL ENCOUNTER (OUTPATIENT)
Dept: WOUND CARE | Age: 89
Discharge: HOME | End: 2025-07-17
Payer: MEDICARE

## 2025-07-17 VITALS
HEART RATE: 56 BPM | BODY MASS INDEX: 20.83 KG/M2 | WEIGHT: 122 LBS | SYSTOLIC BLOOD PRESSURE: 137 MMHG | DIASTOLIC BLOOD PRESSURE: 70 MMHG | RESPIRATION RATE: 18 BRPM | TEMPERATURE: 97.5 F | HEIGHT: 64 IN

## 2025-07-17 DIAGNOSIS — L97.912 LEG ULCER, RIGHT, WITH FAT LAYER EXPOSED (HCC): Primary | ICD-10-CM

## 2025-07-17 PROCEDURE — 11042 DBRDMT SUBQ TIS 1ST 20SQCM/<: CPT

## 2025-07-17 PROCEDURE — 11042 DBRDMT SUBQ TIS 1ST 20SQCM/<: CPT | Performed by: NURSE PRACTITIONER

## 2025-07-17 RX ORDER — LIDOCAINE HYDROCHLORIDE 40 MG/ML
SOLUTION TOPICAL PRN
OUTPATIENT
Start: 2025-07-17

## 2025-07-17 RX ORDER — LIDOCAINE HYDROCHLORIDE 20 MG/ML
JELLY TOPICAL PRN
OUTPATIENT
Start: 2025-07-17

## 2025-07-17 RX ORDER — LIDOCAINE HYDROCHLORIDE 40 MG/ML
SOLUTION TOPICAL PRN
Status: DISCONTINUED | OUTPATIENT
Start: 2025-07-17 | End: 2025-07-18 | Stop reason: HOSPADM

## 2025-07-17 ASSESSMENT — ENCOUNTER SYMPTOMS
VOMITING: 0
COUGH: 0
SHORTNESS OF BREATH: 0
DIARRHEA: 0
NAUSEA: 0
RHINORRHEA: 0

## 2025-07-17 ASSESSMENT — PAIN DESCRIPTION - DESCRIPTORS: DESCRIPTORS: ACHING

## 2025-07-17 ASSESSMENT — PAIN - FUNCTIONAL ASSESSMENT: PAIN_FUNCTIONAL_ASSESSMENT: PREVENTS OR INTERFERES SOME ACTIVE ACTIVITIES AND ADLS

## 2025-07-17 ASSESSMENT — PAIN DESCRIPTION - PAIN TYPE: TYPE: CHRONIC PAIN

## 2025-07-17 ASSESSMENT — PAIN DESCRIPTION - ORIENTATION: ORIENTATION: RIGHT

## 2025-07-17 ASSESSMENT — PAIN DESCRIPTION - ONSET: ONSET: ON-GOING

## 2025-07-17 ASSESSMENT — PAIN SCALES - GENERAL: PAINLEVEL_OUTOF10: 4

## 2025-07-17 ASSESSMENT — PAIN DESCRIPTION - LOCATION: LOCATION: LEG

## 2025-07-17 ASSESSMENT — PAIN DESCRIPTION - FREQUENCY: FREQUENCY: INTERMITTENT

## 2025-07-17 NOTE — PROGRESS NOTES
[facility]   Progress Note and Procedure Note      Rosmery Damon  MEDICAL RECORD NUMBER:  [id number]  AGE: 89 y.o.   GENDER: female  : 1935  EPISODE DATE:  2025    Subjective:     Chief Complaint   Patient presents with    Wound Check     Right Pretib         HISTORY of PRESENT ILLNESS HPI     Rosmery Damon is a 89 y.o. female who presents today for wound/ulcer evaluation.   History of Wound Context: here with  and daughter in follow up on right lower leg wound. Has been using vashe moist to moist which worked well. Now with granulation tissue throughout base of wound. Will transition to compression wrap and have her follow up in one week.   Fell on her 's cane 2025 in [employer] parking lot. Finished course of Levaquin.  Wound/Ulcer Pain Timing/Severity: intermittent  Quality of pain: burning  Severity:  2 / 10   Modifying Factors: None  Associated Signs/Symptoms: none    Ulcer Identification:  Ulcer Type: traumatic  Contributing Factors: venous stasis and decreased mobility         PAST MEDICAL HISTORY        Diagnosis Date    Abnormal electrocardiography 10/29/2017    Acute coronary syndrome (HCC) 10/29/2017    Ankle fracture, left 10/28/2016    Arthritis     Bilateral neck pain 2015    Costochondral chest pain 2021    Hypertension     Non-healing surgical wound 2017    Onychomycosis of toenail 2017    Open wound of ankle 2016    Thyroid disease        PAST SURGICAL HISTORY    Past Surgical History:   Procedure Laterality Date    ANKLE FRACTURE SURGERY Left 10/18/2016    dr. armida julien    CARPAL TUNNEL RELEASE Right 2023    RIGHT CARPAL TUNNEL RELEASE performed by Keisha Hicks MD at [facility] OR    CATARACT REMOVAL WITH IMPLANT Bilateral     SHOULDER ARTHROSCOPY Left     anaya    THYROID SURGERY  1960 approx    goiter    TONSILLECTOMY  195    TOTAL KNEE ARTHROPLASTY Right

## 2025-07-17 NOTE — PLAN OF CARE
Problem: Pain  Goal: Verbalizes/displays adequate comfort level or baseline comfort level  Outcome: Progressing     Problem: ABCDS Injury Assessment  Goal: Absence of physical injury  Outcome: Progressing     Problem: Cognitive:  Goal: Knowledge of wound care  Description: Knowledge of wound care  Outcome: Progressing  Goal: Understands risk factors for wounds  Description: Understands risk factors for wounds  Outcome: Progressing     Problem: Wound:  Goal: Will show signs of wound healing; wound closure and no evidence of infection  Description: Will show signs of wound healing; wound closure and no evidence of infection  Outcome: Progressing     Problem: Falls - Risk of:  Goal: Will remain free from falls  Description: Will remain free from falls  Outcome: Progressing

## 2025-07-17 NOTE — PROGRESS NOTES
Multilayer Compression Wrap   (Not Unna) Below the Knee    NAME:  Rosmery Damon  YOB: 1935  MEDICAL RECORD NUMBER:  [id number]  DATE:  7/17/2025    Multilayer compression wrap: Removed old Multilayer wrap if indicated and wash leg with mild soap/water.  Applied moisturizing agent to dry skin as needed.   Applied primary and secondary dressing as ordered.  Applied multilayered dressing below the knee to right lower leg.  Instructed patient/caregiver not to remove dressing and to keep it clean and dry.   Instructed patient/caregiver on complications to report to provider, such as pain, numbness in toes, heavy drainage, and slippage of dressing.  Instructed patient on purpose of compression dressing and on activity and exercise recommendations.    Applied per Resident  Electronically signed by Candy Roemro RN on 7/17/2025 at 10:35 AM

## 2025-07-24 ENCOUNTER — HOSPITAL ENCOUNTER (OUTPATIENT)
Dept: WOUND CARE | Age: 89
Discharge: HOME | End: 2025-07-24
Payer: MEDICARE

## 2025-07-24 VITALS
RESPIRATION RATE: 18 BRPM | TEMPERATURE: 97.3 F | HEIGHT: 64 IN | SYSTOLIC BLOOD PRESSURE: 152 MMHG | DIASTOLIC BLOOD PRESSURE: 73 MMHG | BODY MASS INDEX: 20.83 KG/M2 | WEIGHT: 122 LBS | HEART RATE: 55 BPM

## 2025-07-24 DIAGNOSIS — L97.912 LEG ULCER, RIGHT, WITH FAT LAYER EXPOSED (HCC): Primary | ICD-10-CM

## 2025-07-24 PROCEDURE — 11042 DBRDMT SUBQ TIS 1ST 20SQCM/<: CPT | Performed by: NURSE PRACTITIONER

## 2025-07-24 PROCEDURE — 11042 DBRDMT SUBQ TIS 1ST 20SQCM/<: CPT

## 2025-07-24 RX ORDER — LIDOCAINE HYDROCHLORIDE 40 MG/ML
SOLUTION TOPICAL PRN
Status: DISCONTINUED | OUTPATIENT
Start: 2025-07-24 | End: 2025-07-25 | Stop reason: HOSPADM

## 2025-07-24 RX ORDER — LIDOCAINE HYDROCHLORIDE 40 MG/ML
SOLUTION TOPICAL PRN
OUTPATIENT
Start: 2025-07-24

## 2025-07-24 RX ORDER — LIDOCAINE HYDROCHLORIDE 20 MG/ML
JELLY TOPICAL PRN
OUTPATIENT
Start: 2025-07-24

## 2025-07-24 RX ADMIN — LIDOCAINE HYDROCHLORIDE 5 ML: 40 SOLUTION TOPICAL at 10:31

## 2025-07-24 ASSESSMENT — ENCOUNTER SYMPTOMS
VOMITING: 0
NAUSEA: 0
COUGH: 0
SHORTNESS OF BREATH: 0
RHINORRHEA: 0
DIARRHEA: 0

## 2025-07-24 ASSESSMENT — PAIN DESCRIPTION - FREQUENCY: FREQUENCY: INTERMITTENT

## 2025-07-24 ASSESSMENT — PAIN DESCRIPTION - DESCRIPTORS: DESCRIPTORS: SHOOTING

## 2025-07-24 ASSESSMENT — PAIN DESCRIPTION - ONSET: ONSET: ON-GOING

## 2025-07-24 ASSESSMENT — PAIN SCALES - GENERAL: PAINLEVEL_OUTOF10: 0

## 2025-07-24 ASSESSMENT — PAIN DESCRIPTION - ORIENTATION: ORIENTATION: RIGHT;LOWER

## 2025-07-24 ASSESSMENT — PAIN - FUNCTIONAL ASSESSMENT: PAIN_FUNCTIONAL_ASSESSMENT: PREVENTS OR INTERFERES SOME ACTIVE ACTIVITIES AND ADLS

## 2025-07-24 ASSESSMENT — PAIN DESCRIPTION - PAIN TYPE: TYPE: CHRONIC PAIN

## 2025-07-24 NOTE — DISCHARGE INSTRUCTIONS
[facility]                            Visit  Discharge Instructions / Physician Orders  DATE:7/24/25     Home Care: none     SUPPLIES ORDERED THRU:                     DATE LAST SUPPLIED     Wound Location:  RIGHT LOWER LEG     Cleanse with: Liquid antibacterial soap and water, rinse well      Dressing Orders:  Primary dressing  fibracol, silvercel, kerramax, zinc layer, 3 layer wrap.                      Frequency:  keep dry and in place.     Additional Orders: Increase protein to diet (meat, cheese, eggs, fish, peanut butter, nuts and beans)  Multivitamin daily  Elevate legs if swollen or wearing compression when sitting  OFFLOADING [] YES  TYPE:                  [] NA     Weekly wound care visits until determined otherwise.     Antibiotic therapy-wound care related YES [] NO [] NA[]  DRUG-                                                             Duration-             Script sent to-                      on (date)  MY CHART []     Smart Device  []      HYPERBARIC TREATMENT-                TREATMENT #                          Your next appointment with the Wound Care Center is in 1 week                                                                                                   (Please note your next appointment above and if you are unable to keep, kindly give a 24 hour notice. Thank you.)  If more than 15 min late we cannot guarantee you will be seen due to clinician schedule     Per Policy,  3 or more cancellations or no show may result in dismissal from program  If you experience any of the following, please call the Wound Care Center during business hours:  [phone]     * Increase in Pain  * Temperature over 101  * Increase in drainage from your wound  * Drainage with a foul odor  * Bleeding  * Increase in swelling  * Need for compression bandage changes due to slippage, breakthrough drainage.     If you need medical attention outside of the business hours

## 2025-07-24 NOTE — PROGRESS NOTES
[facility]   Progress Note and Procedure Note      Rosmery Damon  MEDICAL RECORD NUMBER:  [id number]  AGE: 89 y.o.   GENDER: female  : 1935  EPISODE DATE:  2025    Subjective:     Chief Complaint   Patient presents with    Wound Check     Right lower leg         HISTORY of PRESENT ILLNESS HPI     Rosmery Damon is a 89 y.o. female who presents today for wound/ulcer evaluation.   History of Wound Context: presents in follow up on right lower leg wound that is healing. Did well with compression wrap. Fell on her 's cane 2025 in [employer] parking lot.   Wound/Ulcer Pain Timing/Severity: intermittent  Quality of pain: burning  Severity:  2 / 10   Modifying Factors: None  Associated Signs/Symptoms: none    Ulcer Identification:  Ulcer Type: traumatic  Contributing Factors: venous stasis and decreased mobility         PAST MEDICAL HISTORY        Diagnosis Date    Abnormal electrocardiography 10/29/2017    Acute coronary syndrome (HCC) 10/29/2017    Ankle fracture, left 10/28/2016    Arthritis     Bilateral neck pain 2015    Costochondral chest pain 2021    Hypertension     Non-healing surgical wound 2017    Onychomycosis of toenail 2017    Open wound of ankle 2016    Thyroid disease        PAST SURGICAL HISTORY    Past Surgical History:   Procedure Laterality Date    ANKLE FRACTURE SURGERY Left 10/18/2016    dr. armida lennon    CARPAL TUNNEL RELEASE Right 2023    RIGHT CARPAL TUNNEL RELEASE performed by Keisha Hicks MD at [facility] OR    CATARACT REMOVAL WITH IMPLANT Bilateral     SHOULDER ARTHROSCOPY Left     anaya    THYROID SURGERY  1960 approx    goiter    TONSILLECTOMY      TOTAL KNEE ARTHROPLASTY Right     Dr. Lennon       FAMILY HISTORY    Family History   Problem Relation Age of Onset    Stroke Mother     Heart Disease Mother     Heart Attack Mother     Other Father         Dementia

## 2025-07-28 NOTE — DISCHARGE INSTRUCTIONS
[facility]PER[facility]  CENTER                            Visit  Discharge Instructions / Physician Orders  DATE:7/31/25     Home Care: none     SUPPLIES ORDERED THRU:                     DATE LAST SUPPLIED     Wound Location:  RIGHT LOWER LEG     Cleanse with: Liquid antibacterial soap and water, rinse well      Dressing Orders:  Primary dressing  fibracol, silvercel, kerramax, zinc layer, 3 layer wrap.                      Frequency:  keep dry and in place.     Additional Orders: Increase protein to diet (meat, cheese, eggs, fish, peanut butter, nuts and beans)  Multivitamin daily  Elevate legs if swollen or wearing compression when sitting  OFFLOADING [] YES  TYPE:                  [] NA     Weekly wound care visits until determined otherwise.     Antibiotic therapy-wound care related YES [] NO [] NA[]  DRUG-                                                             Duration-             Script sent to-                      on (date)  MY CHART []     Smart Device  []      HYPERBARIC TREATMENT-                TREATMENT #                          Your next appointment with the Wound Care Center is in 1 week                                                                                                   (Please note your next appointment above and if you are unable to keep, kindly give a 24 hour notice. Thank you.)  If more than 15 min late we cannot guarantee you will be seen due to clinician schedule     Per Policy,  3 or more cancellations or no show may result in dismissal from program  If you experience any of the following, please call the Wound Care Center during business hours:  [phone]     * Increase in Pain  * Temperature over 101  * Increase in drainage from your wound  * Drainage with a foul odor  * Bleeding  * Increase in swelling  * Need for compression bandage changes due to slippage, breakthrough drainage.     If you need medical attention outside of the business hours

## 2025-07-31 ENCOUNTER — HOSPITAL ENCOUNTER (OUTPATIENT)
Dept: WOUND CARE | Age: 89
Discharge: HOME | End: 2025-07-31
Payer: MEDICARE

## 2025-07-31 VITALS
TEMPERATURE: 95.5 F | WEIGHT: 122 LBS | SYSTOLIC BLOOD PRESSURE: 145 MMHG | HEIGHT: 64 IN | DIASTOLIC BLOOD PRESSURE: 74 MMHG | BODY MASS INDEX: 20.83 KG/M2 | HEART RATE: 58 BPM | RESPIRATION RATE: 18 BRPM

## 2025-07-31 DIAGNOSIS — L97.912 LEG ULCER, RIGHT, WITH FAT LAYER EXPOSED (HCC): Primary | ICD-10-CM

## 2025-07-31 PROCEDURE — 11042 DBRDMT SUBQ TIS 1ST 20SQCM/<: CPT | Performed by: NURSE PRACTITIONER

## 2025-07-31 PROCEDURE — 11042 DBRDMT SUBQ TIS 1ST 20SQCM/<: CPT

## 2025-07-31 RX ORDER — LIDOCAINE HYDROCHLORIDE 40 MG/ML
SOLUTION TOPICAL PRN
OUTPATIENT
Start: 2025-07-31

## 2025-07-31 RX ORDER — LIDOCAINE HYDROCHLORIDE 20 MG/ML
JELLY TOPICAL PRN
OUTPATIENT
Start: 2025-07-31

## 2025-07-31 RX ORDER — LIDOCAINE HYDROCHLORIDE 40 MG/ML
SOLUTION TOPICAL PRN
Status: DISCONTINUED | OUTPATIENT
Start: 2025-07-31 | End: 2025-08-01 | Stop reason: HOSPADM

## 2025-07-31 RX ADMIN — LIDOCAINE HYDROCHLORIDE 5 ML: 40 SOLUTION TOPICAL at 10:31

## 2025-07-31 ASSESSMENT — ENCOUNTER SYMPTOMS
COUGH: 0
SHORTNESS OF BREATH: 0
DIARRHEA: 0
VOMITING: 0
RHINORRHEA: 0
NAUSEA: 0

## 2025-07-31 ASSESSMENT — PAIN SCALES - GENERAL: PAINLEVEL_OUTOF10: 0

## 2025-07-31 NOTE — PLAN OF CARE
Problem: Cognitive:  Goal: Knowledge of wound care  Description: Knowledge of wound care  7/31/2025 1009 by Jason Watts RN  Outcome: Progressing  7/31/2025 1002 by Jason Watts RN  Outcome: Progressing  Goal: Understands risk factors for wounds  Description: Understands risk factors for wounds  7/31/2025 1009 by Jason Watts RN  Outcome: Progressing  7/31/2025 1002 by Jason Watts RN  Outcome: Progressing     Problem: Wound:  Goal: Will show signs of wound healing; wound closure and no evidence of infection  Description: Will show signs of wound healing; wound closure and no evidence of infection  7/31/2025 1009 by Jason Watts RN  Outcome: Progressing  7/31/2025 1002 by Jason Watts RN  Outcome: Progressing     Problem: Falls - Risk of:  Goal: Will remain free from falls  Description: Will remain free from falls  7/31/2025 1009 by Jason Watts RN  Outcome: Progressing  7/31/2025 1002 by Jason Watts RN  Outcome: Progressing

## 2025-07-31 NOTE — PROGRESS NOTES
Multilayer Compression Wrap   (Not Unna) Below the Knee    NAME:  Rosmery Damon  YOB: 1935  MEDICAL RECORD NUMBER:  [id number]  DATE:  7/31/2025    Multilayer compression wrap: Removed old Multilayer wrap if indicated and wash leg with mild soap/water.  Applied moisturizing agent to dry skin as needed.   Applied primary and secondary dressing as ordered.  Applied multilayered dressing below the knee to right lower leg.  Instructed patient/caregiver not to remove dressing and to keep it clean and dry.   Instructed patient/caregiver on complications to report to provider, such as pain, numbness in toes, heavy drainage, and slippage of dressing.  Instructed patient on purpose of compression dressing and on activity and exercise recommendations.      Electronically signed by Naomi Mc RN on 7/31/2025 at 11:09 AM  
POA.           Electronically signed by FRANKLIN FRANCOIS CNP on 7/31/2025 at 10:57 AM

## 2025-08-04 ENCOUNTER — HOSPITAL ENCOUNTER (OUTPATIENT)
Dept: WOUND CARE | Age: 89
Discharge: HOME | End: 2025-08-04
Payer: MEDICARE

## 2025-08-04 VITALS
WEIGHT: 122 LBS | HEART RATE: 67 BPM | TEMPERATURE: 95.7 F | BODY MASS INDEX: 20.83 KG/M2 | SYSTOLIC BLOOD PRESSURE: 143 MMHG | RESPIRATION RATE: 18 BRPM | DIASTOLIC BLOOD PRESSURE: 55 MMHG | HEIGHT: 64 IN

## 2025-08-04 DIAGNOSIS — L97.912 LEG ULCER, RIGHT, WITH FAT LAYER EXPOSED (HCC): Primary | ICD-10-CM

## 2025-08-04 PROCEDURE — 11042 DBRDMT SUBQ TIS 1ST 20SQCM/<: CPT | Performed by: PLASTIC SURGERY

## 2025-08-04 PROCEDURE — 11042 DBRDMT SUBQ TIS 1ST 20SQCM/<: CPT

## 2025-08-04 RX ORDER — LIDOCAINE HYDROCHLORIDE 40 MG/ML
SOLUTION TOPICAL PRN
Status: DISCONTINUED | OUTPATIENT
Start: 2025-08-04 | End: 2025-08-05 | Stop reason: HOSPADM

## 2025-08-04 RX ORDER — LIDOCAINE HYDROCHLORIDE 40 MG/ML
SOLUTION TOPICAL PRN
OUTPATIENT
Start: 2025-08-04

## 2025-08-04 RX ORDER — LIDOCAINE HYDROCHLORIDE 20 MG/ML
JELLY TOPICAL PRN
OUTPATIENT
Start: 2025-08-04

## 2025-08-04 RX ADMIN — LIDOCAINE HYDROCHLORIDE 5 ML: 40 SOLUTION TOPICAL at 14:15

## 2025-08-04 ASSESSMENT — PAIN SCALES - GENERAL: PAINLEVEL_OUTOF10: 0

## 2025-08-08 ENCOUNTER — OFFICE VISIT (OUTPATIENT)
Dept: PRIMARY CARE CLINIC | Age: 89
End: 2025-08-08

## 2025-08-08 VITALS
DIASTOLIC BLOOD PRESSURE: 74 MMHG | HEART RATE: 53 BPM | BODY MASS INDEX: 21.34 KG/M2 | SYSTOLIC BLOOD PRESSURE: 132 MMHG | WEIGHT: 125 LBS | HEIGHT: 64 IN | OXYGEN SATURATION: 98 %

## 2025-08-08 DIAGNOSIS — G56.02 CARPAL TUNNEL SYNDROME, LEFT: Primary | ICD-10-CM

## 2025-08-08 DIAGNOSIS — I10 ESSENTIAL HYPERTENSION: ICD-10-CM

## 2025-08-11 ENCOUNTER — HOSPITAL ENCOUNTER (OUTPATIENT)
Dept: WOUND CARE | Age: 89
Discharge: HOME | End: 2025-08-11
Payer: MEDICARE

## 2025-08-11 VITALS
DIASTOLIC BLOOD PRESSURE: 66 MMHG | TEMPERATURE: 97 F | SYSTOLIC BLOOD PRESSURE: 135 MMHG | RESPIRATION RATE: 20 BRPM | HEART RATE: 56 BPM

## 2025-08-11 DIAGNOSIS — L30.9 DERMATITIS: ICD-10-CM

## 2025-08-11 DIAGNOSIS — L97.912 LEG ULCER, RIGHT, WITH FAT LAYER EXPOSED (HCC): Primary | ICD-10-CM

## 2025-08-11 PROCEDURE — 11042 DBRDMT SUBQ TIS 1ST 20SQCM/<: CPT

## 2025-08-11 RX ORDER — LIDOCAINE HYDROCHLORIDE 40 MG/ML
SOLUTION TOPICAL PRN
OUTPATIENT
Start: 2025-08-11

## 2025-08-11 RX ORDER — BENZOCAINE/MENTHOL 6 MG-10 MG
LOZENGE MUCOUS MEMBRANE
Qty: 30 G | Refills: 1 | Status: SHIPPED | OUTPATIENT
Start: 2025-08-11 | End: 2025-08-18

## 2025-08-11 RX ORDER — LIDOCAINE HYDROCHLORIDE 40 MG/ML
SOLUTION TOPICAL PRN
Status: DISCONTINUED | OUTPATIENT
Start: 2025-08-11 | End: 2025-08-12 | Stop reason: HOSPADM

## 2025-08-11 RX ORDER — LIDOCAINE HYDROCHLORIDE 20 MG/ML
JELLY TOPICAL PRN
OUTPATIENT
Start: 2025-08-11

## 2025-08-11 RX ADMIN — LIDOCAINE HYDROCHLORIDE 10 ML: 40 SOLUTION TOPICAL at 10:08

## 2025-08-18 ENCOUNTER — HOSPITAL ENCOUNTER (OUTPATIENT)
Dept: WOUND CARE | Age: 89
Discharge: HOME | End: 2025-08-18
Payer: MEDICARE

## 2025-08-18 ENCOUNTER — OFFICE VISIT (OUTPATIENT)
Dept: PRIMARY CARE CLINIC | Age: 89
End: 2025-08-18

## 2025-08-18 VITALS
HEART RATE: 59 BPM | TEMPERATURE: 97 F | DIASTOLIC BLOOD PRESSURE: 70 MMHG | SYSTOLIC BLOOD PRESSURE: 155 MMHG | RESPIRATION RATE: 20 BRPM

## 2025-08-18 VITALS
HEIGHT: 64 IN | BODY MASS INDEX: 21.34 KG/M2 | SYSTOLIC BLOOD PRESSURE: 132 MMHG | DIASTOLIC BLOOD PRESSURE: 74 MMHG | WEIGHT: 125 LBS | OXYGEN SATURATION: 97 % | HEART RATE: 62 BPM

## 2025-08-18 DIAGNOSIS — L30.9 DERMATITIS: Primary | ICD-10-CM

## 2025-08-18 DIAGNOSIS — L97.912 LEG ULCER, RIGHT, WITH FAT LAYER EXPOSED (HCC): Primary | ICD-10-CM

## 2025-08-18 DIAGNOSIS — L30.9 DERMATITIS: ICD-10-CM

## 2025-08-18 DIAGNOSIS — R60.0 LOCALIZED EDEMA: ICD-10-CM

## 2025-08-18 PROCEDURE — 11042 DBRDMT SUBQ TIS 1ST 20SQCM/<: CPT | Performed by: INTERNAL MEDICINE

## 2025-08-18 PROCEDURE — 11042 DBRDMT SUBQ TIS 1ST 20SQCM/<: CPT

## 2025-08-18 PROCEDURE — 99214 OFFICE O/P EST MOD 30 MIN: CPT | Performed by: INTERNAL MEDICINE

## 2025-08-18 RX ORDER — LIDOCAINE HYDROCHLORIDE 20 MG/ML
JELLY TOPICAL PRN
OUTPATIENT
Start: 2025-08-18

## 2025-08-18 RX ORDER — LIDOCAINE HYDROCHLORIDE 40 MG/ML
SOLUTION TOPICAL PRN
Status: DISCONTINUED | OUTPATIENT
Start: 2025-08-18 | End: 2025-08-19 | Stop reason: HOSPADM

## 2025-08-18 RX ORDER — PREDNISONE 20 MG/1
20 TABLET ORAL 2 TIMES DAILY
Qty: 10 TABLET | Refills: 0 | Status: SHIPPED | OUTPATIENT
Start: 2025-08-18 | End: 2025-08-23

## 2025-08-18 RX ORDER — LIDOCAINE HYDROCHLORIDE 40 MG/ML
SOLUTION TOPICAL PRN
OUTPATIENT
Start: 2025-08-18

## 2025-08-18 RX ADMIN — LIDOCAINE HYDROCHLORIDE 10 ML: 40 SOLUTION TOPICAL at 10:27

## 2025-08-18 ASSESSMENT — ENCOUNTER SYMPTOMS
COLOR CHANGE: 1
SHORTNESS OF BREATH: 0
COUGH: 0

## 2025-08-21 ENCOUNTER — OFFICE VISIT (OUTPATIENT)
Dept: PRIMARY CARE CLINIC | Age: 89
End: 2025-08-21

## 2025-08-21 VITALS
TEMPERATURE: 97.8 F | HEIGHT: 64 IN | DIASTOLIC BLOOD PRESSURE: 90 MMHG | SYSTOLIC BLOOD PRESSURE: 144 MMHG | HEART RATE: 71 BPM | WEIGHT: 125.2 LBS | BODY MASS INDEX: 21.37 KG/M2 | OXYGEN SATURATION: 97 %

## 2025-08-21 DIAGNOSIS — L03.116 CELLULITIS OF LEFT LOWER EXTREMITY: Primary | ICD-10-CM

## 2025-08-21 RX ORDER — CEPHALEXIN 500 MG/1
500 CAPSULE ORAL 4 TIMES DAILY
Qty: 40 CAPSULE | Refills: 0 | Status: SHIPPED | OUTPATIENT
Start: 2025-08-21 | End: 2025-08-31

## 2025-08-21 ASSESSMENT — ENCOUNTER SYMPTOMS
SHORTNESS OF BREATH: 0
COUGH: 0

## 2025-08-25 ENCOUNTER — HOSPITAL ENCOUNTER (OUTPATIENT)
Dept: WOUND CARE | Age: 89
Discharge: HOME | End: 2025-08-25
Payer: MEDICARE

## 2025-08-25 VITALS
RESPIRATION RATE: 20 BRPM | SYSTOLIC BLOOD PRESSURE: 158 MMHG | HEART RATE: 63 BPM | DIASTOLIC BLOOD PRESSURE: 85 MMHG | TEMPERATURE: 97.4 F

## 2025-08-25 DIAGNOSIS — L97.912 LEG ULCER, RIGHT, WITH FAT LAYER EXPOSED (HCC): Primary | ICD-10-CM

## 2025-08-25 DIAGNOSIS — L03.90 CELLULITIS, UNSPECIFIED CELLULITIS SITE: ICD-10-CM

## 2025-08-25 DIAGNOSIS — L30.9 DERMATITIS: ICD-10-CM

## 2025-08-25 PROCEDURE — 99213 OFFICE O/P EST LOW 20 MIN: CPT | Performed by: INTERNAL MEDICINE

## 2025-08-25 PROCEDURE — 11042 DBRDMT SUBQ TIS 1ST 20SQCM/<: CPT | Performed by: INTERNAL MEDICINE

## 2025-08-25 PROCEDURE — 11042 DBRDMT SUBQ TIS 1ST 20SQCM/<: CPT

## 2025-08-25 RX ORDER — BENZOCAINE/MENTHOL 6 MG-10 MG
LOZENGE MUCOUS MEMBRANE
Qty: 30 G | Refills: 1 | Status: SHIPPED | OUTPATIENT
Start: 2025-08-25 | End: 2025-09-01

## 2025-08-25 RX ORDER — LIDOCAINE HYDROCHLORIDE 20 MG/ML
JELLY TOPICAL PRN
OUTPATIENT
Start: 2025-08-25

## 2025-08-25 RX ORDER — LIDOCAINE HYDROCHLORIDE 40 MG/ML
SOLUTION TOPICAL PRN
OUTPATIENT
Start: 2025-08-25

## 2025-08-25 RX ORDER — LIDOCAINE HYDROCHLORIDE 40 MG/ML
SOLUTION TOPICAL PRN
Status: DISCONTINUED | OUTPATIENT
Start: 2025-08-25 | End: 2025-08-26 | Stop reason: HOSPADM

## 2025-08-25 RX ADMIN — LIDOCAINE HYDROCHLORIDE 10 ML: 40 SOLUTION TOPICAL at 09:56

## 2025-08-25 ASSESSMENT — PAIN SCALES - GENERAL: PAINLEVEL_OUTOF10: 0

## 2025-08-27 DIAGNOSIS — L03.116 CELLULITIS OF LEFT LOWER EXTREMITY: ICD-10-CM

## 2025-08-27 DIAGNOSIS — R42 DIZZINESS: ICD-10-CM

## 2025-08-27 DIAGNOSIS — R53.83 OTHER FATIGUE: Primary | ICD-10-CM

## 2025-08-27 LAB
BILIRUBIN, POC: NORMAL
BLOOD URINE, POC: NORMAL
CLARITY, POC: NORMAL
COLOR, POC: NORMAL
GLUCOSE URINE, POC: NORMAL MG/DL
KETONES, POC: NORMAL MG/DL
LEUKOCYTE EST, POC: NORMAL
NITRITE, POC: NORMAL
PH, POC: 7
PROTEIN, POC: NORMAL MG/DL
SPECIFIC GRAVITY, POC: 1.01
UROBILINOGEN, POC: 0.2 MG/DL

## 2025-08-27 PROCEDURE — 81003 URINALYSIS AUTO W/O SCOPE: CPT | Performed by: NURSE PRACTITIONER

## 2025-08-28 ENCOUNTER — HOSPITAL ENCOUNTER (OUTPATIENT)
Age: 89
Discharge: HOME | End: 2025-08-28
Payer: MEDICARE

## 2025-08-28 DIAGNOSIS — R42 DIZZINESS: ICD-10-CM

## 2025-08-28 DIAGNOSIS — R53.83 OTHER FATIGUE: ICD-10-CM

## 2025-08-28 DIAGNOSIS — L03.116 CELLULITIS OF LEFT LOWER EXTREMITY: ICD-10-CM

## 2025-08-28 LAB
ALBUMIN SERPL-MCNC: 4.3 G/DL (ref 3.5–5.2)
ALBUMIN/GLOB SERPL: 1.5 {RATIO} (ref 1–2.5)
ALP SERPL-CCNC: 108 U/L (ref 35–104)
ALT SERPL-CCNC: 19 U/L (ref 10–35)
ANION GAP SERPL CALCULATED.3IONS-SCNC: 10 MMOL/L (ref 9–16)
AST SERPL-CCNC: 24 U/L (ref 10–35)
BASOPHILS # BLD: 0.06 K/UL (ref 0–0.2)
BASOPHILS NFR BLD: 1 % (ref 0–2)
BILIRUB SERPL-MCNC: 0.3 MG/DL (ref 0–1.2)
BUN SERPL-MCNC: 14 MG/DL (ref 8–23)
CALCIUM SERPL-MCNC: 9.3 MG/DL (ref 8.6–10.4)
CHLORIDE SERPL-SCNC: 95 MMOL/L (ref 98–107)
CO2 SERPL-SCNC: 27 MMOL/L (ref 20–31)
CREAT SERPL-MCNC: 0.8 MG/DL (ref 0.6–0.9)
EOSINOPHIL # BLD: 0.27 K/UL (ref 0–0.44)
EOSINOPHILS RELATIVE PERCENT: 3 % (ref 1–4)
ERYTHROCYTE [DISTWIDTH] IN BLOOD BY AUTOMATED COUNT: 13.2 % (ref 11.8–14.4)
GFR, ESTIMATED: 70 ML/MIN/1.73M2
GLUCOSE SERPL-MCNC: 74 MG/DL (ref 74–99)
HCT VFR BLD AUTO: 40.4 % (ref 36.3–47.1)
HGB BLD-MCNC: 13.7 G/DL (ref 11.9–15.1)
IMM GRANULOCYTES # BLD AUTO: 0.03 K/UL (ref 0–0.3)
IMM GRANULOCYTES NFR BLD: 0 %
LYMPHOCYTES NFR BLD: 1.67 K/UL (ref 1.1–3.7)
LYMPHOCYTES RELATIVE PERCENT: 20 % (ref 24–43)
MCH RBC QN AUTO: 30.2 PG (ref 25.2–33.5)
MCHC RBC AUTO-ENTMCNC: 33.9 G/DL (ref 28.4–34.8)
MCV RBC AUTO: 89 FL (ref 82.6–102.9)
MONOCYTES NFR BLD: 1 K/UL (ref 0.1–1.2)
MONOCYTES NFR BLD: 12 % (ref 3–12)
NEUTROPHILS NFR BLD: 64 % (ref 36–65)
NEUTS SEG NFR BLD: 5.29 K/UL (ref 1.5–8.1)
NRBC BLD-RTO: 0 PER 100 WBC
PLATELET # BLD AUTO: 291 K/UL (ref 138–453)
PMV BLD AUTO: 10 FL (ref 8.1–13.5)
POTASSIUM SERPL-SCNC: 4.7 MMOL/L (ref 3.7–5.3)
PROT SERPL-MCNC: 7.1 G/DL (ref 6.6–8.7)
RBC # BLD AUTO: 4.54 M/UL (ref 3.95–5.11)
SODIUM SERPL-SCNC: 132 MMOL/L (ref 136–145)
WBC OTHER # BLD: 8.3 K/UL (ref 3.5–11.3)

## 2025-08-28 PROCEDURE — 36415 COLL VENOUS BLD VENIPUNCTURE: CPT

## 2025-08-28 PROCEDURE — 80053 COMPREHEN METABOLIC PANEL: CPT

## 2025-08-28 PROCEDURE — 85025 COMPLETE CBC W/AUTO DIFF WBC: CPT

## 2025-09-04 ENCOUNTER — HOSPITAL ENCOUNTER (OUTPATIENT)
Dept: WOUND CARE | Age: 89
Discharge: HOME | End: 2025-09-04
Payer: MEDICARE

## 2025-09-04 VITALS
HEART RATE: 57 BPM | TEMPERATURE: 97.1 F | DIASTOLIC BLOOD PRESSURE: 71 MMHG | BODY MASS INDEX: 21.34 KG/M2 | SYSTOLIC BLOOD PRESSURE: 153 MMHG | RESPIRATION RATE: 18 BRPM | HEIGHT: 64 IN | WEIGHT: 125 LBS

## 2025-09-04 DIAGNOSIS — L97.912 LEG ULCER, RIGHT, WITH FAT LAYER EXPOSED (HCC): Primary | ICD-10-CM

## 2025-09-04 PROCEDURE — 11042 DBRDMT SUBQ TIS 1ST 20SQCM/<: CPT

## 2025-09-04 PROCEDURE — 11042 DBRDMT SUBQ TIS 1ST 20SQCM/<: CPT | Performed by: NURSE PRACTITIONER

## 2025-09-04 RX ORDER — LIDOCAINE HYDROCHLORIDE 20 MG/ML
JELLY TOPICAL PRN
OUTPATIENT
Start: 2025-09-04

## 2025-09-04 RX ORDER — LIDOCAINE HYDROCHLORIDE 40 MG/ML
SOLUTION TOPICAL PRN
Status: DISCONTINUED | OUTPATIENT
Start: 2025-09-04 | End: 2025-09-05 | Stop reason: HOSPADM

## 2025-09-04 RX ORDER — LIDOCAINE HYDROCHLORIDE 40 MG/ML
SOLUTION TOPICAL PRN
OUTPATIENT
Start: 2025-09-04

## 2025-09-04 RX ADMIN — LIDOCAINE HYDROCHLORIDE 5 ML: 40 SOLUTION TOPICAL at 09:49

## 2025-09-04 ASSESSMENT — ENCOUNTER SYMPTOMS
DIARRHEA: 0
SHORTNESS OF BREATH: 0
RHINORRHEA: 0
COUGH: 0
NAUSEA: 0
VOMITING: 0

## 2025-09-04 ASSESSMENT — PAIN SCALES - GENERAL: PAINLEVEL_OUTOF10: 0

## (undated) DEVICE — GLOVE ORANGE PI 7   MSG9070

## (undated) DEVICE — BANDAGE GZ W2XL75IN ST RAYON POLY CNFRM STRTCH LTWT

## (undated) DEVICE — 1010 S-DRAPE TOWEL DRAPE 10/BX: Brand: STERI-DRAPE™

## (undated) DEVICE — GLOVE ORANGE PI 7 1/2   MSG9075

## (undated) DEVICE — DISPOSABLE TOURNIQUET CUFF SINGLE BLADDER, SINGLE PORT AND QUICK CONNECT CONNECTOR: Brand: COLOR CUFF

## (undated) DEVICE — SOLUTION IRRIG 1000ML 0.9% SOD CHL USP POUR PLAS BTL

## (undated) DEVICE — SUTURE ETHLN SZ 3-0 L18IN NONABSORBABLE BLK FS-1 L24MM 3/8 663H

## (undated) DEVICE — APPLICATOR MEDICATED 26 CC SOLUTION HI LT ORNG CHLORAPREP

## (undated) DEVICE — BNDG,ELSTC,MATRIX,STRL,2"X5YD,LF,HOOK&LP: Brand: MEDLINE

## (undated) DEVICE — DRESSING,GAUZE,XEROFORM,CURAD,1"X8",ST: Brand: CURAD

## (undated) DEVICE — MHPB HAND AND FOOT PACK: Brand: MEDLINE INDUSTRIES, INC.

## (undated) DEVICE — DRESSING TRNSPAR W5XL4.5IN FLM SHT SEMIPERMEABLE WIND

## (undated) DEVICE — DRAPE,U/ SHT,SPLIT,PLAS,STERIL: Brand: MEDLINE

## (undated) DEVICE — CORD,CAUTERY,BIPOLAR,STERILE: Brand: MEDLINE

## (undated) DEVICE — STRAP,POSITIONING,KNEE/BODY,FOAM,4X60": Brand: MEDLINE